# Patient Record
Sex: MALE | Race: WHITE
[De-identification: names, ages, dates, MRNs, and addresses within clinical notes are randomized per-mention and may not be internally consistent; named-entity substitution may affect disease eponyms.]

---

## 2017-05-19 ENCOUNTER — HOSPITAL ENCOUNTER (OUTPATIENT)
Dept: HOSPITAL 47 - LABWHC1 | Age: 62
Discharge: HOME | End: 2017-05-19
Attending: PODIATRIST
Payer: COMMERCIAL

## 2017-05-19 DIAGNOSIS — I87.2: Primary | ICD-10-CM

## 2017-05-19 LAB
ALP SERPL-CCNC: 74 U/L (ref 38–126)
ALT SERPL-CCNC: 31 U/L (ref 21–72)
ANION GAP SERPL CALC-SCNC: 8 MMOL/L
AST SERPL-CCNC: 22 U/L (ref 17–59)
BASOPHILS # BLD AUTO: 0.1 K/UL (ref 0–0.2)
BASOPHILS NFR BLD AUTO: 1 %
BUN SERPL-SCNC: 21 MG/DL (ref 9–20)
CALCIUM SPEC-MCNC: 8.7 MG/DL (ref 8.4–10.2)
CH: 27.6
CHCM: 31.7
CHLORIDE SERPL-SCNC: 107 MMOL/L (ref 98–107)
CO2 SERPL-SCNC: 26 MMOL/L (ref 22–30)
EOSINOPHIL # BLD AUTO: 0.2 K/UL (ref 0–0.7)
EOSINOPHIL NFR BLD AUTO: 3 %
ERYTHROCYTE [DISTWIDTH] IN BLOOD BY AUTOMATED COUNT: 4.83 M/UL (ref 4.3–5.9)
ERYTHROCYTE [DISTWIDTH] IN BLOOD: 14.4 % (ref 11.5–15.5)
GLUCOSE SERPL-MCNC: 140 MG/DL (ref 74–99)
HCT VFR BLD AUTO: 42.3 % (ref 39–53)
HDW: 3
HGB BLD-MCNC: 13.7 GM/DL (ref 13–17.5)
LUC NFR BLD AUTO: 2 %
LYMPHOCYTES # SPEC AUTO: 1.3 K/UL (ref 1–4.8)
LYMPHOCYTES NFR SPEC AUTO: 16 %
MCH RBC QN AUTO: 28.4 PG (ref 25–35)
MCHC RBC AUTO-ENTMCNC: 32.4 G/DL (ref 31–37)
MCV RBC AUTO: 87.6 FL (ref 80–100)
MONOCYTES # BLD AUTO: 0.4 K/UL (ref 0–1)
MONOCYTES NFR BLD AUTO: 5 %
NEUTROPHILS # BLD AUTO: 6.2 K/UL (ref 1.3–7.7)
NEUTROPHILS NFR BLD AUTO: 74 %
NON-AFRICAN AMERICAN GFR(MDRD): >60
POTASSIUM SERPL-SCNC: 4.2 MMOL/L (ref 3.5–5.1)
PREALB SERPL-MCNC: 13 MG/DL (ref 18–36)
PROT SERPL-MCNC: 6.9 G/DL (ref 6.3–8.2)
SODIUM SERPL-SCNC: 141 MMOL/L (ref 137–145)
WBC # BLD AUTO: 0.15 10*3/UL
WBC # BLD AUTO: 8.4 K/UL (ref 3.8–10.6)
WBC (PEROX): 8.27

## 2017-05-19 PROCEDURE — 84134 ASSAY OF PREALBUMIN: CPT

## 2017-05-19 PROCEDURE — 80053 COMPREHEN METABOLIC PANEL: CPT

## 2017-05-19 PROCEDURE — 85025 COMPLETE CBC W/AUTO DIFF WBC: CPT

## 2017-05-19 PROCEDURE — 36415 COLL VENOUS BLD VENIPUNCTURE: CPT

## 2017-05-31 ENCOUNTER — HOSPITAL ENCOUNTER (OUTPATIENT)
Dept: HOSPITAL 47 - RADUSWWP | Age: 62
Discharge: HOME | End: 2017-05-31
Attending: PODIATRIST
Payer: COMMERCIAL

## 2017-05-31 DIAGNOSIS — R60.9: ICD-10-CM

## 2017-05-31 DIAGNOSIS — M79.604: Primary | ICD-10-CM

## 2017-05-31 DIAGNOSIS — M79.605: ICD-10-CM

## 2017-05-31 PROCEDURE — 93922 UPR/L XTREMITY ART 2 LEVELS: CPT

## 2017-05-31 NOTE — US
LOWER EXTREMITY VENOUS INSUFFICIENCY

 

Wound care patient.  Wound left leg x 2 months.  Hx of blood clots in right leg and PE's. On blood th
inners.  Bilateral hip replacement.  No pain. 

 

Comparison: Prior lower extremity venous ultrasound March 25, 2016

 

SIDE PERFORMED:  Bilateral  

 

1)  Color flow is present and patency is documented in the following vessels.  No DVT or SVT is noted
.       

?   EIV           

?   Common Femoral Vein    

?   Deep Femoral Vein

?   Femoral Vein

?   Popliteal Vein

?   Greater Saph Vein  

 

 

2)  There is venous reflux noted at the following venous levels:  

 

Right CFV, Deep Femoral Vein, Femoral Vein and Popliteal Vein.  

 

Left CFV, Femoral Vein and Popliteal Vein.    

 

 

 

 

 

IMPRESSION: No ultrasound evidence for acute DVT in either lower extremity on today's study. Right gr
eater than left lower extremity venous reflux is seen as detailed above.

## 2017-06-07 NOTE — P.ARTDOP
Arterial Doppler





LOWER EXTREMITY ARTERIAL DOPPLER:





DATE OF SERVICE: 05/31/2017





Reason for study: Left leg ulcer.





Doppler waveforms: Multiphasic bilaterally throughout.





Pulse volume recording: Normal configuration.





Pressure gradients: None.





Ankle-brachial indices: Greater than 1 bilaterally.





Toe pressures: 125 on the right, 140 on the left





Impression: Normal study.

## 2018-05-21 ENCOUNTER — HOSPITAL ENCOUNTER (OUTPATIENT)
Dept: HOSPITAL 47 - RADUSWWP | Age: 63
Discharge: HOME | End: 2018-05-21
Attending: PODIATRIST
Payer: COMMERCIAL

## 2018-05-21 DIAGNOSIS — I87.2: Primary | ICD-10-CM

## 2018-05-21 PROCEDURE — 93923 UPR/LXTR ART STDY 3+ LVLS: CPT

## 2018-05-21 NOTE — US
EXAMINATION TYPE: US venous doppler duplex LE LT

 

DATE OF EXAM: 5/21/2018 9:46 AM

 

COMPARISON: 5/31/2017

 

CLINICAL HISTORY: 63-year-old male M79.605 PAIN IN LT LEG.

 

LOWER EXTREMITY VENOUS INSUFFICIENCY

 

SIDE PERFORMED:  left  

 

FINDINGS:

 

1)  Color flow is present and patency is documented in the following vessels.  No DVT or SVT is noted
.       

   EIV           

   Common Femoral Vein    

 

   Femoral Vein

   Popliteal Vein

   

 

   Greater Saph Vein  

   Upper Small Saph Vein  

 

2)  There is venous reflux noted at the following venous levels: 

Mid and lower femoral vein.

Lower popliteal vein.

 

 

Sonographer notes: Very limited study, unable to see upper femoral vein and DFV at CFV bifurcation du
e to patient body habitus. 

 

There is some subcutaneous soft tissue swelling with edema channels in the upper calf.

 

 

 

IMPRESSION: 

1. Limited exam. Due to patient body habitus, unable to visualize the upper femoral vein or the deep 
femoral vein. These vessels are not assessed.

2. No evidence for DVT within the remainder of the left lower extremity imaged from the groin to the 
calf.

3. Venous reflux seen in the mid and lower femoral vein as well as the lower popliteal vein.

## 2018-05-23 NOTE — P.ARTDOP
Arterial Doppler





LOWER EXTREMITY ARTERIAL DOPPLER:





DATE OF SERVICE: 05/21/2018





Reason for study: Left leg ulcer.





Doppler waveforms: Multiphasic bilaterally throughout.





Pulse volume recording: [].





Pressure gradients: None.





Ankle-brachial indices: Greater than 1 bilaterally.





Toe pressures: 131 on the right, 131 on the left





Impression: Normal study.

## 2018-06-14 ENCOUNTER — HOSPITAL ENCOUNTER (OUTPATIENT)
Dept: HOSPITAL 47 - RADECHMAIN | Age: 63
Discharge: HOME | End: 2018-06-14
Payer: COMMERCIAL

## 2018-06-14 DIAGNOSIS — I10: ICD-10-CM

## 2018-06-14 DIAGNOSIS — E66.01: ICD-10-CM

## 2018-06-14 DIAGNOSIS — I34.0: Primary | ICD-10-CM

## 2018-06-14 PROCEDURE — 93306 TTE W/DOPPLER COMPLETE: CPT

## 2018-06-14 NOTE — ECHOF
Referral Reason:R06.02 Shortness of Breath, I10 Hypertension



MEASUREMENTS

--------

HEIGHT: 175.3 cm

WEIGHT: 222.3 kg

BP: 

RVIDd:   3.7 cm     (< 3.3)

IVSd:   1.6 cm     (0.6 - 1.1)

LVIDd:   5.3 cm     (3.9 - 5.3)

LVPWd:   1.5 cm     (0.6 - 1.1)

IVSs:   1.8 cm

LVIDs:   4.1 cm

LVPWs:   1.7 cm

LA Diam:   4.1 cm     (2.7 - 3.8)

Ao Diam:   4.3 cm     (2.0 - 3.7)

AV Cusp:   1.8 cm     (1.5 - 2.6)

LA Diam:   3.3 cm     (2.7 - 3.8)

EPSS:   1.0 cm

MV E Jose Angel:   0.52 m/s

MV DecT:   218 ms

MV A Jose Angel:   0.55 m/s

MV E/A Ratio:   0.94 

MV EF SLOPE:   72.14 mm/s     (70 - 150)

MV EXCURSION:   1.59 cm     (> 18.000)







FINDINGS

--------

Sinus rhythm.

Morbid Obesity

The left ventricular size is normal.   There is mild concentric left ventricular hypertrophy.   Overa
ll left ventricular systolic function is low-normal with, an EF between 50 - 55 %.

The right ventricle is mild to moderately enlarged.

The left atrial size is normal.

The right atrial size is normal.

1.5mg of Definity was utilized for enhancement of images

The aortic valve is trileaflet, and appears structurally normal. No aortic stenosis or regurgitation.


Mild mitral regurgitation is present.

No regurgitation noted   Right ventricular systolic pressure is normal at < 35 mmHg.   There is no ev
idence of pulmonary hypertension.

The pulmonic valve was not well visualized.

There is no pericardial effusion.



CONCLUSIONS

--------

1. Morbid Obesity

2. The left ventricular size is normal.

3. There is mild concentric left ventricular hypertrophy.

4. Overall left ventricular systolic function is low-normal with, an EF between 50 - 55 %.

5. The right ventricle is mild to moderately enlarged.

6. The left atrial size is normal.

7. The right atrial size is normal.

8. 1.5mg of Definity was utilized for enhancement of images

9. The aortic valve is trileaflet, and appears structurally normal. No aortic stenosis or regurgitati
on.

10. Mild mitral regurgitation is present.

11. No regurgitation noted

12. Right ventricular systolic pressure is normal at < 35 mmHg.

13. There is no evidence of pulmonary hypertension.

14. The pulmonic valve was not well visualized.

15. There is no pericardial effusion.





SONOGRAPHER: Zelda Lou RDCS

## 2018-09-13 ENCOUNTER — HOSPITAL ENCOUNTER (OUTPATIENT)
Dept: HOSPITAL 47 - BARWHC3 | Age: 63
Discharge: HOME | End: 2018-09-13
Attending: SURGERY
Payer: MEDICARE

## 2018-09-13 VITALS
DIASTOLIC BLOOD PRESSURE: 81 MMHG | SYSTOLIC BLOOD PRESSURE: 175 MMHG | TEMPERATURE: 98.5 F | HEART RATE: 84 BPM | RESPIRATION RATE: 16 BRPM

## 2018-09-13 VITALS — BODY MASS INDEX: 74.2 KG/M2

## 2018-09-13 DIAGNOSIS — Z79.01: ICD-10-CM

## 2018-09-13 DIAGNOSIS — I82.409: ICD-10-CM

## 2018-09-13 DIAGNOSIS — E66.01: Primary | ICD-10-CM

## 2018-09-13 PROCEDURE — 99211 OFF/OP EST MAY X REQ PHY/QHP: CPT

## 2018-09-13 NOTE — P.HPBAR
Bariatric H&P





- History & Physicial


H&P Date: 09/13/18


History & Physicial: 


Visit/CC: bariatric consult





Patient initial contact: 





Initial weight: 228.066 kg


Initial weight in pounds: 502.80


Height: 5 ft 9 in


Initial BMI: 74.2





Last weight: 





Current weight: 228.066 kg


Current weight in pounds: 502.80


Current BMI: 74.2





Ideal body weight (based on NIH guidelines): 72.575 kg





Excess body weight loss: 0.0%








The patient is a 63 year-old M who presents for Bariatric Assessment.  Patient 

presents today as a new patient bariatric evaluation.  Patient is several with 

his weight for many years.  Currently has a BMI of 74 with a weight of 502.  

Patient suffers from hypertension, diabetes, arthritis, sleep apnea.  The 

patient has a history of previous DVT with PE.  Currently on anticoagulation.  

Denies dysphagia.  Was initially mainly interested in sleeve gastrectomy.  

Previous surgeries including umbilical hernia and nephrectomy.














Past Medical History


Past Medical History: Asthma, Diabetes Mellitus, Deep Vein Thrombosis (DVT), 

Hypertension, Pneumonia, Pulmonary Embolus (PE), Rheumatoid Arthritis (RA), 

Sleep Apnea/CPAP/BIPAP, Vascular Disorder


Additional Past Medical History / Comment(s): current leg wound lt leg, seizure 

as infant, uses cane, scoliosis, March 2015-dvt rt leg and PE ronny lungs, Type 2 

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 

dependent upon cane for amubulation,


History of Any Multi-Drug Resistant Organisms: None Reported


Past Surgical History: Adenoidectomy, Appendectomy, Hernia Repair, Joint 

Replacement, Tonsillectomy


Additional Past Surgical History / Comment(s): Ronny Hip Replacement, Nephrectomy 

left d/t mass(pt stated found to be benign),


Past Anesthesia/Blood Transfusion Reactions: No Reported Reaction


Past Psychological History: Anxiety, Depression


Smoking Status: Never smoker


Past Alcohol Use History: Occasional


Additional Past Alcohol Use History / Comment(s): .


Past Drug Use History: None Reported





- Past Family History


  ** Son(s)


Additional Family Medical History / Comment(s): He has one son with morbid 

obesity.





  ** Mother


Family Medical History: Cancer


Additional Family Medical History / Comment(s): breast cancer





  ** Father


Family Medical History: Cancer, COPD, Coronary Artery Disease (CAD), 

Hypertension, Myocardial Infarction (MI)


Additional Family Medical History / Comment(s): emphysema





Surgical - Exam


 Vital Signs











Temp Pulse Resp BP


 


 98.5 F   84   16   175/81 


 


 09/13/18 14:31  09/13/18 14:31  09/13/18 14:31  09/13/18 14:31

















Physical exam:


General: Well-developed, well-nourished


HEENT: Normocephalic, sclerae nonicteric


Abdomen: Nontender, nondistended, large pannus, previous scars


Extremities: Lower cavity edema with evidence of venous stasis.


Neuro: Alert and oriented





Bariatric Assessment & Plan


(1) Morbid obesity with BMI of 60.0-69.9, adult


Narrative/Plan: 


Options discussed in detail with the patient.  After discussing the risk 

benefit profile of both gastric bypass and sleeve gastrectomy and the 

anticipated weight loss the patient is open to discussing and looking into 

gastric bypass further.  Patient will go to a bariatric seminar this coming 

week put on by Dr. Borges.  Following that the patient will notify us of his 

decision regarding his preferred surgical procedure.  Associated risks with 

both procedure were reviewed in detail.  Will require preoperative cardiac and 

medical clearance.  Patient will be maintained on postoperative anticoagulation.


Status: Acute   





Bariatric Checklist


Checklist: 


Plan: 





Checklist: 





EGD: 


1. Hiatal hernia: 


2. H. Pylori: 





HgbA1c: 





Vitamin D: 





Smoking: Never smoker





Primary care physician referral: sonia





Psychiatry clearance: 





Cardiology clearance: 





Sleep study: 





Diet journal: 





VTE risk score: 





VTE risk level: 





Rehab needs at discharge:

## 2018-10-11 ENCOUNTER — HOSPITAL ENCOUNTER (OUTPATIENT)
Dept: HOSPITAL 47 - BARWHC3 | Age: 63
Discharge: HOME | End: 2018-10-11
Payer: MEDICARE

## 2018-10-11 VITALS — DIASTOLIC BLOOD PRESSURE: 75 MMHG | HEART RATE: 76 BPM | SYSTOLIC BLOOD PRESSURE: 164 MMHG | TEMPERATURE: 98 F

## 2018-10-11 VITALS — BODY MASS INDEX: 73.3 KG/M2

## 2018-10-11 DIAGNOSIS — E55.9: ICD-10-CM

## 2018-10-11 DIAGNOSIS — F32.9: ICD-10-CM

## 2018-10-11 DIAGNOSIS — E88.81: ICD-10-CM

## 2018-10-11 DIAGNOSIS — E11.622: Primary | ICD-10-CM

## 2018-10-11 DIAGNOSIS — I11.9: ICD-10-CM

## 2018-10-11 DIAGNOSIS — Z98.890: ICD-10-CM

## 2018-10-11 DIAGNOSIS — L98.499: ICD-10-CM

## 2018-10-11 DIAGNOSIS — E66.01: ICD-10-CM

## 2018-10-11 DIAGNOSIS — Z90.89: ICD-10-CM

## 2018-10-11 DIAGNOSIS — G47.30: ICD-10-CM

## 2018-10-11 DIAGNOSIS — E44.0: ICD-10-CM

## 2018-10-11 DIAGNOSIS — F41.9: ICD-10-CM

## 2018-10-11 LAB
ALBUMIN SERPL-MCNC: 3.5 G/DL (ref 3.5–5)
ALP SERPL-CCNC: 75 U/L (ref 38–126)
ALT SERPL-CCNC: 38 U/L (ref 21–72)
ANION GAP SERPL CALC-SCNC: 6 MMOL/L
AST SERPL-CCNC: 19 U/L (ref 17–59)
BUN SERPL-SCNC: 21 MG/DL (ref 9–20)
CALCIUM SPEC-MCNC: 9.1 MG/DL (ref 8.4–10.2)
CHLORIDE SERPL-SCNC: 99 MMOL/L (ref 98–107)
CHOLEST SERPL-MCNC: 139 MG/DL (ref ?–200)
CO2 SERPL-SCNC: 34 MMOL/L (ref 22–30)
ERYTHROCYTE [DISTWIDTH] IN BLOOD BY AUTOMATED COUNT: 5.54 M/UL (ref 4.3–5.9)
ERYTHROCYTE [DISTWIDTH] IN BLOOD: 14.2 % (ref 11.5–15.5)
GLUCOSE SERPL-MCNC: 110 MG/DL (ref 74–99)
HBA1C MFR BLD: 8.1 % (ref 4–6)
HCT VFR BLD AUTO: 47.2 % (ref 39–53)
HDLC SERPL-MCNC: 48 MG/DL (ref 40–60)
HGB BLD-MCNC: 14.9 GM/DL (ref 13–17.5)
LDLC SERPL CALC-MCNC: 74 MG/DL (ref 0–99)
MCH RBC QN AUTO: 26.9 PG (ref 25–35)
MCHC RBC AUTO-ENTMCNC: 31.6 G/DL (ref 31–37)
MCV RBC AUTO: 85.2 FL (ref 80–100)
PLATELET # BLD AUTO: 208 K/UL (ref 150–450)
POTASSIUM SERPL-SCNC: 4.3 MMOL/L (ref 3.5–5.1)
PROT SERPL-MCNC: 7.3 G/DL (ref 6.3–8.2)
SODIUM SERPL-SCNC: 139 MMOL/L (ref 137–145)
TRIGL SERPL-MCNC: 84 MG/DL (ref ?–150)
VIT B12 SERPL-MCNC: 528 PG/ML (ref 200–944)
WBC # BLD AUTO: 15.5 K/UL (ref 3.8–10.6)

## 2018-10-11 PROCEDURE — 93005 ELECTROCARDIOGRAM TRACING: CPT

## 2018-10-11 PROCEDURE — 82728 ASSAY OF FERRITIN: CPT

## 2018-10-11 PROCEDURE — 80061 LIPID PANEL: CPT

## 2018-10-11 PROCEDURE — 99211 OFF/OP EST MAY X REQ PHY/QHP: CPT

## 2018-10-11 PROCEDURE — 84425 ASSAY OF VITAMIN B-1: CPT

## 2018-10-11 PROCEDURE — 80053 COMPREHEN METABOLIC PANEL: CPT

## 2018-10-11 PROCEDURE — 82306 VITAMIN D 25 HYDROXY: CPT

## 2018-10-11 PROCEDURE — 83550 IRON BINDING TEST: CPT

## 2018-10-11 PROCEDURE — 83540 ASSAY OF IRON: CPT

## 2018-10-11 PROCEDURE — 82746 ASSAY OF FOLIC ACID SERUM: CPT

## 2018-10-11 PROCEDURE — 84443 ASSAY THYROID STIM HORMONE: CPT

## 2018-10-11 PROCEDURE — 83036 HEMOGLOBIN GLYCOSYLATED A1C: CPT

## 2018-10-11 PROCEDURE — 85027 COMPLETE CBC AUTOMATED: CPT

## 2018-10-11 PROCEDURE — 82607 VITAMIN B-12: CPT

## 2018-10-11 PROCEDURE — 36415 COLL VENOUS BLD VENIPUNCTURE: CPT

## 2018-10-11 NOTE — P.HPBAR
Bariatric H&P





- History & Physicial


H&P Date: 10/11/18


History & Physicial: 


Visit/CC: RnY consult





Patient initial contact: 





Initial weight: 228.066 kg


Initial weight in pounds: 502.80


Height: 5 ft 9 in


Initial BMI: 74.2





Last weight: 





Current weight: 225.163 kg


Current weight in pounds: 496.40


Current BMI: 73.3





Ideal body weight (based on NIH guidelines): 72.575 kg





Excess body weight loss: 1.8%








The patient is a 63 year-old M who presents for Bariatric Assessment.








HPI: The patient presents for bariatric evaluation following recent diagnosis 

of diabetes. His highest weight is 503 pounds. He is a patient at the wound 

care center for chronic wound ulcers.





ABDOMEN: Protuberant





PLAN:


1. Recommend diabetic education


2. Bariatric labs


3. Will need multiple clearances given high risk


4. Evaluation of sleeve gastrectomy and bypass reviewed.





Past Medical History


Past Medical History: Asthma, Diabetes Mellitus, Deep Vein Thrombosis (DVT), 

Hypertension, Pneumonia, Pulmonary Embolus (PE), Rheumatoid Arthritis (RA), 

Sleep Apnea/CPAP/BIPAP, Vascular Disorder


Additional Past Medical History / Comment(s): current leg wound lt leg, seizure 

as infant, uses cane, scoliosis, March 2015-dvt rt leg and PE ronny lungs, Type 2 

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 

dependent upon cane for amubulation,


History of Any Multi-Drug Resistant Organisms: None Reported


Past Surgical History: Adenoidectomy, Appendectomy, Hernia Repair, Joint 

Replacement, Tonsillectomy


Additional Past Surgical History / Comment(s): Ronny Hip Replacement, Nephrectomy 

left d/t mass(pt stated found to be benign),


Past Anesthesia/Blood Transfusion Reactions: No Reported Reaction


Past Psychological History: Anxiety, Depression


Smoking Status: Never smoker


Past Alcohol Use History: Occasional


Additional Past Alcohol Use History / Comment(s): .


Past Drug Use History: None Reported





- Past Family History


  ** Son(s)


Additional Family Medical History / Comment(s): He has one son with morbid 

obesity.





  ** Mother


Family Medical History: Cancer


Additional Family Medical History / Comment(s): breast cancer





  ** Father


Family Medical History: Cancer, COPD, Coronary Artery Disease (CAD), 

Hypertension, Myocardial Infarction (MI)


Additional Family Medical History / Comment(s): emphysema





Surgical - Exam


 Vital Signs











Temp Pulse BP


 


 98 F   76   164/75 


 


 10/11/18 12:11  10/11/18 12:11  10/11/18 12:11














Results





- Labs





 10/11/18 13:40





 10/11/18 13:40


 Abnormal Lab Results - Last 24 Hours (Table)











  10/11/18 10/11/18 10/11/18 Range/Units





  13:40 13:40 13:40 


 


WBC  15.5 H    (3.8-10.6)  k/uL


 


Carbon Dioxide   34 H   (22-30)  mmol/L


 


BUN   21 H   (9-20)  mg/dL


 


Glucose   110 H   (74-99)  mg/dL


 


Hemoglobin A1c     (4.0-6.0)  %


 


Iron    42 L  ()  ug/dL


 


Iron Saturation    11.60 L  (15.00-50.00)   














  10/11/18 Range/Units





  13:40 


 


WBC   (3.8-10.6)  k/uL


 


Carbon Dioxide   (22-30)  mmol/L


 


BUN   (9-20)  mg/dL


 


Glucose   (74-99)  mg/dL


 


Hemoglobin A1c  8.1 H  (4.0-6.0)  %


 


Iron   ()  ug/dL


 


Iron Saturation   (15.00-50.00)   








 Diabetes panel











  10/11/18 10/11/18 Range/Units





  13:40 13:40 


 


Sodium  139   (137-145)  mmol/L


 


Potassium  4.3   (3.5-5.1)  mmol/L


 


Chloride  99   ()  mmol/L


 


Carbon Dioxide  34 H   (22-30)  mmol/L


 


BUN  21 H   (9-20)  mg/dL


 


Creatinine  0.77   (0.66-1.25)  mg/dL


 


Glucose  110 H   (74-99)  mg/dL


 


Hemoglobin A1c   8.1 H  (4.0-6.0)  %


 


Calcium  9.1   (8.4-10.2)  mg/dL


 


AST  19   (17-59)  U/L


 


ALT  38   (21-72)  U/L


 


Alkaline Phosphatase  75   ()  U/L


 


Total Protein  7.3   (6.3-8.2)  g/dL


 


Albumin  3.5   (3.5-5.0)  g/dL


 


Triglycerides  84   (<150)  mg/dL


 


HDL Cholesterol  48   (40-60)  mg/dL








 Thyroid panel











  10/11/18 Range/Units





  13:40 


 


TSH  1.230  (0.465-4.680)  mIU/L








 Calcium panel











  10/11/18 Range/Units





  13:40 


 


Calcium  9.1  (8.4-10.2)  mg/dL


 


Albumin  3.5  (3.5-5.0)  g/dL








 Pituitary panel











  10/11/18 Range/Units





  13:40 


 


Sodium  139  (137-145)  mmol/L


 


Potassium  4.3  (3.5-5.1)  mmol/L


 


Chloride  99  ()  mmol/L


 


Carbon Dioxide  34 H  (22-30)  mmol/L


 


BUN  21 H  (9-20)  mg/dL


 


Creatinine  0.77  (0.66-1.25)  mg/dL


 


Glucose  110 H  (74-99)  mg/dL


 


Calcium  9.1  (8.4-10.2)  mg/dL


 


TSH  1.230  (0.465-4.680)  mIU/L








 Adrenal panel











  10/11/18 Range/Units





  13:40 


 


Sodium  139  (137-145)  mmol/L


 


Potassium  4.3  (3.5-5.1)  mmol/L


 


Chloride  99  ()  mmol/L


 


Carbon Dioxide  34 H  (22-30)  mmol/L


 


BUN  21 H  (9-20)  mg/dL


 


Creatinine  0.77  (0.66-1.25)  mg/dL


 


Glucose  110 H  (74-99)  mg/dL


 


Calcium  9.1  (8.4-10.2)  mg/dL


 


Total Bilirubin  0.7  (0.2-1.3)  mg/dL


 


AST  19  (17-59)  U/L


 


ALT  38  (21-72)  U/L


 


Alkaline Phosphatase  75  ()  U/L


 


Total Protein  7.3  (6.3-8.2)  g/dL


 


Albumin  3.5  (3.5-5.0)  g/dL














Bariatric Checklist


Checklist: 


Plan: 





Checklist: 





EGD: 


1. Hiatal hernia: 


2. H. Pylori: 





HgbA1c: 





Vitamin D: 





Smoking: Never smoker





Primary care physician referral: sonia





Psychiatry clearance: 





Cardiology clearance: 





Sleep study: 





Diet journal: 





VTE risk score: 





VTE risk level: 





Rehab needs at discharge:

## 2018-11-04 NOTE — P.GSHP
History of Present Illness


H&P Date: 11/05/18














CHIEF COMPLAINT: GERD





HISTORY OF PRESENT ILLNESS: The patient is a 63-year-old male who


presents reports gastroesophageal reflux disease.  Upper endoscopy was offered 

for further evaluation and management.





PAST MEDICAL HISTORY: 


Please see list.





PAST SURGICAL HISTORY: 


Please see list.





MEDICATIONS: 


Please see list.





ALLERGIES:  Please see list. 





SOCIAL HISTORY: No illicit drug use





FAMILY HISTORY: No reports of Crohn disease or ulcerative colitis. 





REVIEW OF ORGAN SYSTEMS: 


CONSTITUTIONAL: No reports of fevers or chills. 


GI:  Denies any blood in stools or constipation. 





PHYSICAL EXAM: 


VITAL SIGNS:  Stable


GENERAL: Well-developed and pleasant in no acute distress. 


HEENT: No scleral icterus. Extraocular movements grossly


intact. Moist buccal mucosa. 


NECK: Supple without lymphadenopathy. 


CHEST: Unlabored respirations. Equal bilateral excursions. 


CARDIOVASCULAR: Regular rate and rhythm. Distal 2+ pulses. 


ABDOMEN: Soft, nondistended.  


MUSCULOSKELETAL: No clubbing, cyanosis, or edema. 





ASSESSMENT: 


1.  Gastroesophageal reflux disease





PLAN: 


1. Recommend proceeding with an upper endoscopy





Past Medical History


Past Medical History: Asthma, Diabetes Mellitus, Deep Vein Thrombosis (DVT), 

Hypertension, Pneumonia, Pulmonary Embolus (PE), Rheumatoid Arthritis (RA), 

Sleep Apnea/CPAP/BIPAP, Vascular Disorder


Additional Past Medical History / Comment(s): , seizure as infant, uses cane, 

scoliosis, March 2015-dvt rt leg and PE ronny lungs, Type 2 DM, Nerve damage to 

right quadricep following right hip replacement left him dependent upon cane 

for amubulation,


History of Any Multi-Drug Resistant Organisms: None Reported


Past Surgical History: Adenoidectomy, Appendectomy, Hernia Repair, Joint 

Replacement, Tonsillectomy


Additional Past Surgical History / Comment(s): Ronny Hip Replacement, Nephrectomy 

left d/t mass(pt stated found to be benign),.  COLONOSCOPY,


Past Anesthesia/Blood Transfusion Reactions: No Reported Reaction


Smoking Status: Never smoker





- Past Family History


  ** Son(s)


Additional Family Medical History / Comment(s): He has one son with morbid 

obesity.





  ** Mother


Family Medical History: Cancer


Additional Family Medical History / Comment(s): breast cancer





  ** Father


Family Medical History: Cancer, COPD, Coronary Artery Disease (CAD), 

Hypertension, Myocardial Infarction (MI)


Additional Family Medical History / Comment(s): emphysema





Medications and Allergies


 Home Medications











 Medication  Instructions  Recorded  Confirmed  Type


 


Carisoprodol [Soma] 350 mg PO TID PRN 05/01/14 10/31/18 History


 


traMADol HCL [Ultram] 50 mg PO Q8HR PRN 11/15/17 10/31/18 History


 


Albuterol Nebulized [Ventolin 2.5 mg INHALATION TID PRN 05/08/18 10/31/18 

History





Nebulized]    


 


Metoprolol Tartrate 25 mg PO BID 05/08/18 10/31/18 History


 


Rivaroxaban [Xarelto] 10 mg PO DAILY 05/08/18 10/31/18 History


 


Lisinopril [Zestril] 5 mg PO DAILY 08/23/18 10/31/18 History


 


Vit D3/Folic Acid/B2/B6/B12 1.25 tab PO Q30D 08/23/18 10/31/18 History





[Folgard Tablet]    


 


metFORMIN HCL [Glucophage] 500 mg PO BID 10/11/18 10/31/18 History











 Allergies











Allergy/AdvReac Type Severity Reaction Status Date / Time


 


Sulfa (Sulfonamide Allergy  throat Verified 10/31/18 15:14





Antibiotics)   swelling

## 2018-11-05 ENCOUNTER — HOSPITAL ENCOUNTER (OUTPATIENT)
Dept: HOSPITAL 47 - ORWHC2ENDO | Age: 63
Discharge: HOME | End: 2018-11-05
Payer: MEDICARE

## 2018-11-05 VITALS — DIASTOLIC BLOOD PRESSURE: 75 MMHG | SYSTOLIC BLOOD PRESSURE: 114 MMHG

## 2018-11-05 VITALS — RESPIRATION RATE: 18 BRPM

## 2018-11-05 VITALS — HEART RATE: 71 BPM

## 2018-11-05 VITALS — BODY MASS INDEX: 73.2 KG/M2

## 2018-11-05 VITALS — TEMPERATURE: 99 F

## 2018-11-05 DIAGNOSIS — I10: ICD-10-CM

## 2018-11-05 DIAGNOSIS — K29.50: Primary | ICD-10-CM

## 2018-11-05 DIAGNOSIS — M41.9: ICD-10-CM

## 2018-11-05 DIAGNOSIS — E66.01: ICD-10-CM

## 2018-11-05 DIAGNOSIS — Z86.711: ICD-10-CM

## 2018-11-05 DIAGNOSIS — J45.909: ICD-10-CM

## 2018-11-05 DIAGNOSIS — Z86.718: ICD-10-CM

## 2018-11-05 DIAGNOSIS — Z79.84: ICD-10-CM

## 2018-11-05 DIAGNOSIS — Z79.899: ICD-10-CM

## 2018-11-05 DIAGNOSIS — Z79.01: ICD-10-CM

## 2018-11-05 DIAGNOSIS — E11.9: ICD-10-CM

## 2018-11-05 DIAGNOSIS — G47.33: ICD-10-CM

## 2018-11-05 DIAGNOSIS — Z88.2: ICD-10-CM

## 2018-11-05 DIAGNOSIS — Z99.89: ICD-10-CM

## 2018-11-05 LAB — GLUCOSE BLD-MCNC: 141 MG/DL (ref 75–99)

## 2018-11-05 PROCEDURE — 88305 TISSUE EXAM BY PATHOLOGIST: CPT

## 2018-11-05 PROCEDURE — 43239 EGD BIOPSY SINGLE/MULTIPLE: CPT

## 2018-11-05 NOTE — P.PCN
Date of Procedure: 11/05/18


Description of Procedure: 











PREOPERATIVE DIAGNOSIS:


Gastroesophageal reflux disease.


Morbid obesity.





POSTOPERATIVE DIAGNOSIS:


Morbid obesity.


Gastroesophageal reflux disease.





OPERATION:


Esophagogastroduodenoscopy with biopsies along antrum.





SURGEON: Naz Lao MD





ANESTHESIA: MAC.





INDICATIONS:


The patient is a 63-year-old male who presents with a history of reflux 

disease. Benefits and risks of the procedure were described. Informed consent 

was obtained.





DESCRIPTION:


The patient was brought into the endoscopy suite and laid in the left lateral 

decubitus position. An Olympus gastroscope was passed along the posterior 

oropharynx down to the distal esophagus where the squamocolumnar junction was 

encountered at 42 cm from the incisors. The stomach was entered and no bile 

reflux was found.  Additional findings are listed below. Biopsies with cold 

forceps were obtained of the antrum. The first through third portion of the 

duodenum was examined and unremarkable. Retroflexion of the scope confirmed  

Hill grade 2 lower esophageal valve. The squamocolumnar junction demonstrated 

no LA grade A erosive esophagitis. The stomach was desufflated. The patient 

tolerated the procedure well.





FINDINGS:


Squamocolumnar junction 40 cm from the incisors.


Diaphragmatic hiatus at 40 cm.


Hill grade 2 lower esophageal valve.


No LA grade A erosive esophagitis.


No active duodenitis.


Mild chronic gastritis





RECOMMENDATIONS:


Upper endoscopy as needed.





Plan - Discharge Summary


New Discharge Prescriptions: 


No Action


   Carisoprodol [Soma] 350 mg PO TID PRN


     PRN Reason: Pain


   traMADol HCL [Ultram] 50 mg PO Q8HR PRN


     PRN Reason: Pain


   Rivaroxaban [Xarelto] 10 mg PO DAILY


   Metoprolol Tartrate 25 mg PO BID


   Albuterol Nebulized [Ventolin Nebulized] 2.5 mg INHALATION TID PRN


     PRN Reason: sob


   Lisinopril [Zestril] 5 mg PO DAILY


   Vit D3/Folic Acid/B2/B6/B12 [Folgard Tablet] 1.25 tab PO Q30D


   metFORMIN HCL [Glucophage] 500 mg PO BID


Discharge Medication List





Carisoprodol [Soma] 350 mg PO TID PRN 05/01/14 [History]


traMADol HCL [Ultram] 50 mg PO Q8HR PRN 11/15/17 [History]


Albuterol Nebulized [Ventolin Nebulized] 2.5 mg INHALATION TID PRN 05/08/18 [

History]


Metoprolol Tartrate 25 mg PO BID 05/08/18 [History]


Rivaroxaban [Xarelto] 10 mg PO DAILY 05/08/18 [History]


Lisinopril [Zestril] 5 mg PO DAILY 08/23/18 [History]


Vit D3/Folic Acid/B2/B6/B12 [Folgard Tablet] 1.25 tab PO Q30D 08/23/18 [History]


metFORMIN HCL [Glucophage] 500 mg PO BID 10/11/18 [History]

## 2018-12-03 ENCOUNTER — HOSPITAL ENCOUNTER (OUTPATIENT)
Dept: HOSPITAL 47 - BARWHC3 | Age: 63
Discharge: HOME | End: 2018-12-03
Payer: MEDICARE

## 2018-12-03 VITALS — BODY MASS INDEX: 73.2 KG/M2

## 2018-12-03 DIAGNOSIS — E66.01: Primary | ICD-10-CM

## 2018-12-03 PROCEDURE — 97804 MEDICAL NUTRITION GROUP: CPT

## 2019-04-05 ENCOUNTER — HOSPITAL ENCOUNTER (OUTPATIENT)
Dept: HOSPITAL 47 - LABWHC1 | Age: 64
End: 2019-04-05
Attending: PODIATRIST
Payer: MEDICARE

## 2019-04-05 DIAGNOSIS — I87.2: Primary | ICD-10-CM

## 2019-04-05 LAB
ALBUMIN SERPL-MCNC: 3.3 G/DL (ref 3.8–4.9)
ALBUMIN/GLOB SERPL: 1.06 G/DL (ref 1.6–3.17)
ALP SERPL-CCNC: 57 U/L (ref 41–126)
ALT SERPL-CCNC: 16 U/L (ref 10–49)
ANION GAP SERPL CALC-SCNC: 7.5 MMOL/L (ref 4–12)
AST SERPL-CCNC: 21 U/L (ref 14–35)
BUN SERPL-SCNC: 15 MG/DL (ref 9–27)
CALCIUM SPEC-MCNC: 8.7 MG/DL (ref 8.7–10.3)
CHLORIDE SERPL-SCNC: 102 MMOL/L (ref 96–109)
CO2 SERPL-SCNC: 30.5 MMOL/L (ref 21.6–31.8)
ERYTHROCYTE [DISTWIDTH] IN BLOOD BY AUTOMATED COUNT: 5.17 M/UL (ref 4.3–5.9)
ERYTHROCYTE [DISTWIDTH] IN BLOOD: 15.2 % (ref 11.5–15.5)
GLOBULIN SER CALC-MCNC: 3.1 G/DL (ref 1.6–3.3)
GLUCOSE SERPL-MCNC: 87 MG/DL (ref 70–110)
HBA1C MFR BLD: 6.2 % (ref 4–6)
HCT VFR BLD AUTO: 43.2 % (ref 39–53)
HGB BLD-MCNC: 13.3 GM/DL (ref 13–17.5)
MCH RBC QN AUTO: 25.7 PG (ref 25–35)
MCHC RBC AUTO-ENTMCNC: 30.7 G/DL (ref 31–37)
MCV RBC AUTO: 83.5 FL (ref 80–100)
PLATELET # BLD AUTO: 193 K/UL (ref 150–450)
POTASSIUM SERPL-SCNC: 4.7 MMOL/L (ref 3.5–5.5)
PROT SERPL-MCNC: 6.4 G/DL (ref 6.2–8.2)
SODIUM SERPL-SCNC: 140 MMOL/L (ref 135–145)
WBC # BLD AUTO: 9.1 K/UL (ref 3.8–10.6)

## 2019-04-05 PROCEDURE — 36415 COLL VENOUS BLD VENIPUNCTURE: CPT

## 2019-04-05 PROCEDURE — 85027 COMPLETE CBC AUTOMATED: CPT

## 2019-04-05 PROCEDURE — 80053 COMPREHEN METABOLIC PANEL: CPT

## 2019-04-05 PROCEDURE — 84134 ASSAY OF PREALBUMIN: CPT

## 2019-04-05 PROCEDURE — 83036 HEMOGLOBIN GLYCOSYLATED A1C: CPT

## 2019-06-05 ENCOUNTER — HOSPITAL ENCOUNTER (OUTPATIENT)
Dept: HOSPITAL 47 - BARWHC3 | Age: 64
End: 2019-06-05
Payer: MEDICARE

## 2019-06-05 VITALS — TEMPERATURE: 98.3 F | DIASTOLIC BLOOD PRESSURE: 66 MMHG | SYSTOLIC BLOOD PRESSURE: 102 MMHG | HEART RATE: 64 BPM

## 2019-06-05 VITALS — BODY MASS INDEX: 71.1 KG/M2

## 2019-06-05 DIAGNOSIS — I11.9: ICD-10-CM

## 2019-06-05 DIAGNOSIS — G47.33: ICD-10-CM

## 2019-06-05 DIAGNOSIS — I87.8: ICD-10-CM

## 2019-06-05 DIAGNOSIS — Z79.899: ICD-10-CM

## 2019-06-05 DIAGNOSIS — M17.0: ICD-10-CM

## 2019-06-05 DIAGNOSIS — M19.90: ICD-10-CM

## 2019-06-05 DIAGNOSIS — I26.99: ICD-10-CM

## 2019-06-05 DIAGNOSIS — F41.8: ICD-10-CM

## 2019-06-05 DIAGNOSIS — I82.409: ICD-10-CM

## 2019-06-05 DIAGNOSIS — E11.9: ICD-10-CM

## 2019-06-05 DIAGNOSIS — Z79.84: ICD-10-CM

## 2019-06-05 DIAGNOSIS — E66.01: Primary | ICD-10-CM

## 2019-06-05 DIAGNOSIS — Z88.2: ICD-10-CM

## 2019-06-05 DIAGNOSIS — J18.9: ICD-10-CM

## 2019-06-05 DIAGNOSIS — Z88.1: ICD-10-CM

## 2019-06-05 DIAGNOSIS — J45.909: ICD-10-CM

## 2019-06-05 DIAGNOSIS — M06.9: ICD-10-CM

## 2019-06-05 PROCEDURE — 99211 OFF/OP EST MAY X REQ PHY/QHP: CPT

## 2019-06-05 NOTE — P.PN
Subjective


Progress Note Date: 06/05/19





He is looking into gastrectomy procedure. He has completed cardiac, pulmonary. 

He has chronic infections and had elevated WBC.  He ambulates with walker, canes

and wheelchair. He has lost 20+ pounds in 8 months. His blood pressure is 

improved. Sleeve procedure consent. All labs and clearances reviewed. Risks 

included.








DATE OF SERVICE: 10/11/2018





REASON FOR CONSULTATION: Initial bariatric evaluation. 





HISTORY OF PRESENT ILLNESS:  Kareem Groves is a 63-year-old male who comes in with 

super morbid obesity.  He patient presents for bariatric evaluation following 

recent diagnosis of diabetes. His highest weight is 503 pounds. He is a patient 

at the wound care center for chronic wound ulcers.  He has limited mobility as 

he uses a scooter to move around.  He has history of severe lymphedema of the 

bilateral lower extremities.





At height of 5 feet 9 inches, his ideal body weight is 168 pounds.  He comes in 

495 pounds.  His highest weight  is 503 pounds. His body mass index highest was 

74.0. Today his BMI is 73.3.  He is 327 pounds overweight.





PAST MEDICAL HISTORY: 


1.  Morbid obesity due to excess calories


2.  Body mass index of 74.0, initial


3.  Osteoarthritis of the knees.


4.  Osteoarthritis of the lower back.


5.  Hypertensive heart disease.


6.  Asthma


7.  Obstructive sleep apnea


8.  Deep venous thrombosis


9.  Pulmonary embolism


10. Rheumatoid arthritis


11. Pneumonia


12. Diabetes type 2


13.  Anxiety disorder


14.  Depressive disorder


15.  Venous stasis disease





PAST SURGICAL HISTORY: 


1.  Appendectomy


2.  Tonsillectomy


3.  Adenoidectomy


4.  Hernia repair


5.  Bilateral hip replacement


6.  Nephrectomy





HOME MEDICATIONS: 





ALLERGIES:





                     Home Medications











 Medication  Instructions  Recorded  Confirmed  Type


 


Carisoprodol [Soma] 350 mg PO TID PRN 05/01/14 10/31/18 History


 


traMADol HCL [Ultram] 50 mg PO Q8HR PRN 11/15/17 10/31/18 History


 


Albuterol Nebulized [Ventolin 2.5 mg INHALATION TID PRN 05/08/18 10/31/18 

History





Nebulized]    


 


Metoprolol Tartrate 25 mg PO BID 05/08/18 10/31/18 History


 


Rivaroxaban [Xarelto] 10 mg PO DAILY 05/08/18 10/31/18 History


 


Lisinopril [Zestril] 5 mg PO DAILY 08/23/18 10/31/18 History


 


Vit D3/Folic Acid/B2/B6/B12 1.25 tab PO Q30D 08/23/18 10/31/18 History





[Folgard Tablet]    


 


metFORMIN HCL [Glucophage] 500 mg PO BID 10/11/18 10/31/18 History











                                    Allergies











Allergy/AdvReac Type Severity Reaction Status Date / Time


 


Sulfa (Sulfonamide Allergy  throat Verified 10/31/18 15:14





Antibiotics)   swelling  

















SOCIAL HISTORY: No active tobacco use.   





FAMILY HISTORY: No family history of ulcerative colitis disease or Crohn's 

disease. Family history of morbid obesity. No lupus in the family.  No reports 

of stomach or esophageal cancer.  





REVIEW OF ORGAN SYSTEMS: 


CONSTITUTIONAL:  At height of 5 feet 9 inches, his ideal body weight is 168 

pounds.  He comes in 495 pounds.  His highest weight  is 503 pounds. His body 

mass index highest was 74.0. Today his BMI is 73.3.  He is 327 pounds 

overweight.


HEENT: Denies any active troubles with vision or hearing.  No troubles with 

swallowing.  


ENDOCRINE: No diabetes. No hypothyroidism. 


CARDIOVASCULAR: No reports of palpitations or heart attacks or chest pain.  Has 

bilateral leg ulcers.


RESPIRATORY: Has daytime somnolence. Has asthma. 


GI: Denies any bright red blood per rectum.  No diarrhea. Has constipation.


MUSCULOSKELETAL: Has lower back pain and joint pain.  Has osteoarthritis of the 

knees.  History of bilateral lower extremity edema.


NEURO: No headaches. No seizure disorders. 


PSYCH: Has depression. No suicidal ideation.   


RHEUMATOLOGIC: No lupus.  No rheumatoid arthritis.  


HEMATOLOGIC: Denies any abnormal bleeding or bruising.  No personal history of 

DVTs.  


SKIN: No rash.  No skin cancer.  History of venous stasis disease.





PHYSICAL EXAM: 


VITAL SIGNS: Height 5 foot 9 inches, weight 495 pounds. BMI 73.3


                                   Vital Signs











Temp  98 F   10/11/18 12:11


 


Pulse  76   10/11/18 12:11


 


Resp      


 


BP  164/75   10/11/18 12:11


 


Pulse Ox      














GENERAL: Well-developed in no acute distress.  


HEENT: No scleral icterus.  Extraocular movements grossly intact.  Hears 

conversational speech.  No nasal drainage.


NECK: Supple without lymphadenopathy. 


CHEST: Nonlabored respirations with equal bilateral excursions. 


CARDIOVASCULAR: Regular rate and regular rhythm. Distal 2+ pulses. 


ABDOMEN: Obese, soft, nontender, nondistended. 


MUSCULOSKELETAL: No clubbing, cyanosis. Gross strength 5/5 distal lower 

extremities.  


NEURO: No focal or lateralizing signs. Cranial nerves 2 through 12 grossly 

within normal limits. 


PSYCH: Appropriate affect. Alert and oriented to person, place and time.


SKIN: Good skin turgor.  Well perfused.





ASSESSMENT: 


1.  Morbid obesity due to excess calories


2.  Body mass index of 74.0, initial


3.  Osteoarthritis of the knees.


4.  Osteoarthritis of the lower back.


5.  Hypertensive heart disease.


6.  Asthma


7.  Obstructive sleep apnea


8.  Deep venous thrombosis


9.  Pulmonary embolism


10. Rheumatoid arthritis


11. Pneumonia


12. Diabetes type 2


13.  Anxiety disorder


14.  Depressive disorder


15.  Venous stasis disease





PLAN: 


1.  Surgical options including a band, gastric bypass, sleeve gastrectomy were 

described in detail.  Alternatives such as gastric balloon including duodenal 

switch were described.


2.  The [place] bariatric surgical collaborative data and outcomes calculator 

were described with surgical options.


3.  Recommend a bariatric metabolic panel to evaluate for micro- including 

macronutrient deficiencies. 


4.  For history of daytime somnolence, recommend evaluation and treatment for 

sleep apnea.


5.  Dietary surveillance and counseling was reviewed, I have asked increased 

protein intake to at least 65 grams daily.  


6.  Will need cardiac risk assessment.


7.  Recommend medical risk assessment.


8.  Psych assessment per insurance guidelines.


9.  Recommend upper endoscopy.


10.  Recommend 12-lead EKG.


11.  Recommend diabetic education


12.  Will need multiple clearances given her high risk


13.  Evaluation of sleeve gastrectomy and bypass reviewed per patient request..








Objective





- Vital Signs


Vital signs: 


                                   Vital Signs











Temp  98.3 F   06/05/19 15:01


 


Pulse  64   06/05/19 15:01


 


Resp      


 


BP  102/66   06/05/19 15:01


 


Pulse Ox      








                                 Intake & Output











 06/04/19 06/05/19 06/05/19





 18:59 06:59 18:59


 


Weight   218.586 kg

## 2019-07-17 ENCOUNTER — HOSPITAL ENCOUNTER (OUTPATIENT)
Dept: HOSPITAL 47 - BARWHC3 | Age: 64
Discharge: HOME | End: 2019-07-17
Payer: MEDICARE

## 2019-07-17 DIAGNOSIS — Z53.9: Primary | ICD-10-CM

## 2019-08-13 ENCOUNTER — HOSPITAL ENCOUNTER (OUTPATIENT)
Dept: HOSPITAL 47 - LABPAT | Age: 64
Discharge: HOME | End: 2019-08-13
Payer: MEDICARE

## 2019-08-13 DIAGNOSIS — Z01.812: Primary | ICD-10-CM

## 2019-08-13 DIAGNOSIS — Z01.818: ICD-10-CM

## 2019-08-13 LAB
ALBUMIN SERPL-MCNC: 3.7 G/DL (ref 3.5–5)
ALP SERPL-CCNC: 69 U/L (ref 38–126)
ALT SERPL-CCNC: 29 U/L (ref 21–72)
ANION GAP SERPL CALC-SCNC: 11 MMOL/L
AST SERPL-CCNC: 21 U/L (ref 17–59)
BASOPHILS # BLD AUTO: 0 K/UL (ref 0–0.2)
BASOPHILS NFR BLD AUTO: 0 %
BUN SERPL-SCNC: 26 MG/DL (ref 9–20)
CALCIUM SPEC-MCNC: 9.6 MG/DL (ref 8.4–10.2)
CHLORIDE SERPL-SCNC: 100 MMOL/L (ref 98–107)
CO2 SERPL-SCNC: 25 MMOL/L (ref 22–30)
EOSINOPHIL # BLD AUTO: 0.1 K/UL (ref 0–0.7)
EOSINOPHIL NFR BLD AUTO: 1 %
ERYTHROCYTE [DISTWIDTH] IN BLOOD BY AUTOMATED COUNT: 5.48 M/UL (ref 4.3–5.9)
ERYTHROCYTE [DISTWIDTH] IN BLOOD: 15.6 % (ref 11.5–15.5)
GLUCOSE SERPL-MCNC: 86 MG/DL (ref 74–99)
HCT VFR BLD AUTO: 46.7 % (ref 39–53)
HGB BLD-MCNC: 14.5 GM/DL (ref 13–17.5)
LYMPHOCYTES # SPEC AUTO: 2.6 K/UL (ref 1–4.8)
LYMPHOCYTES NFR SPEC AUTO: 18 %
MCH RBC QN AUTO: 26.5 PG (ref 25–35)
MCHC RBC AUTO-ENTMCNC: 31.1 G/DL (ref 31–37)
MCV RBC AUTO: 85.2 FL (ref 80–100)
MONOCYTES # BLD AUTO: 0.7 K/UL (ref 0–1)
MONOCYTES NFR BLD AUTO: 5 %
NEUTROPHILS # BLD AUTO: 10.8 K/UL (ref 1.3–7.7)
NEUTROPHILS NFR BLD AUTO: 75 %
PLATELET # BLD AUTO: 187 K/UL (ref 150–450)
POTASSIUM SERPL-SCNC: 4.7 MMOL/L (ref 3.5–5.1)
PROT SERPL-MCNC: 7.4 G/DL (ref 6.3–8.2)
SODIUM SERPL-SCNC: 136 MMOL/L (ref 137–145)
WBC # BLD AUTO: 14.4 K/UL (ref 3.8–10.6)

## 2019-08-13 PROCEDURE — 36415 COLL VENOUS BLD VENIPUNCTURE: CPT

## 2019-08-13 PROCEDURE — 80053 COMPREHEN METABOLIC PANEL: CPT

## 2019-08-13 PROCEDURE — 85025 COMPLETE CBC W/AUTO DIFF WBC: CPT

## 2019-08-13 PROCEDURE — 86850 RBC ANTIBODY SCREEN: CPT

## 2019-08-13 PROCEDURE — 86900 BLOOD TYPING SEROLOGIC ABO: CPT

## 2019-08-13 PROCEDURE — 86901 BLOOD TYPING SEROLOGIC RH(D): CPT

## 2019-08-18 NOTE — P.GSHP
History of Present Illness


H&P Date: 08/19/19











DATE OF SERVICE: 08/19/2019





CHIEF COMPLAINT: Morbid obesity 





HISTORY OF PRESENT ILLNESS:  Kareem Groves is a 64-year-old male who comes in with 

super morbid obesity.  He has chronic wound ulcers and has diabetes as a result 

of his morbid obesity.  He has limited mobility including severe lymphedema of 

the bilateral lower extremities. He is looking into gastrectomy procedures. He 

has completed cardiac and pulmonary clearance. He has chronic infections and had

elevated WBC.  He ambulates with walker, canes and wheelchair. He has lost 20+ 

pounds in 8 months. His blood pressure has improved. 





At height of 5 feet 9 inches, his ideal body weight is 168 pounds.  He comes in 

481 pounds from 495 pounds, 8 months ago.  He has lost 15 pounds in 8 months. 

His highest weight is 503 pounds. His body mass index highest was 74.4. Today 

his BMI is 71.2.  He is 312 pounds overweight.





PAST MEDICAL HISTORY: 


1.  Morbid obesity due to excess calories


2.  Body mass index of 74.4 initial


3.  Osteoarthritis of the knees.


4.  Osteoarthritis of the lower back.


5.  Hypertensive heart disease.


6.  Asthma


7.  Obstructive sleep apnea


8.  Deep venous thrombosis


9.  Pulmonary embolism


10. Rheumatoid arthritis


11. Pneumonia


12. Diabetes type 2


13.  Anxiety disorder


14.  Depressive disorder


15.  Venous stasis disease





PAST SURGICAL HISTORY: 


1.  Appendectomy


2.  Tonsillectomy


3.  Adenoidectomy


4.  Hernia repair


5.  Bilateral hip replacement


6.  Nephrectomy





HOME MEDICATIONS: 





ALLERGIES:





                     Home Medications











 Medication  Instructions  Recorded  Confirmed  Type


 


Carisoprodol [Soma] 350 mg PO TID PRN 05/01/14 10/31/18 History


 


traMADol HCL [Ultram] 50 mg PO Q8HR PRN 11/15/17 10/31/18 History


 


Albuterol Nebulized [Ventolin 2.5 mg INHALATION TID PRN 05/08/18 10/31/18 

History





Nebulized]    


 


Metoprolol Tartrate 25 mg PO BID 05/08/18 10/31/18 History


 


Rivaroxaban [Xarelto] 10 mg PO DAILY 05/08/18 10/31/18 History


 


Lisinopril [Zestril] 5 mg PO DAILY 08/23/18 10/31/18 History


 


Vit D3/Folic Acid/B2/B6/B12 1.25 tab PO Q30D 08/23/18 10/31/18 History





[Folgard Tablet]    


 


metFORMIN HCL [Glucophage] 500 mg PO BID 10/11/18 10/31/18 History











                                    Allergies











Allergy/AdvReac Type Severity Reaction Status Date / Time


 


Sulfa (Sulfonamide Allergy  throat Verified 10/31/18 15:14





Antibiotics)   swelling  

















SOCIAL HISTORY: No active tobacco use.   





FAMILY HISTORY: No family history of ulcerative colitis disease or Crohn's 

disease. Family history of morbid obesity. No lupus in the family.  No reports 

of stomach or esophageal cancer.  





REVIEW OF ORGAN SYSTEMS: 


CONSTITUTIONAL:  At height of 5 feet 9 inches, his ideal body weight is 168 

pounds. His highest weight  is 503 pounds. His body mass index highest was 74.4.

He was 327 pounds overweight.


HEENT: Denies any active troubles with vision or hearing.  No troubles with 

swallowing.  


ENDOCRINE: No diabetes. No hypothyroidism. 


CARDIOVASCULAR: No reports of palpitations or heart attacks or chest pain.  Has 

bilateral leg ulcers.


RESPIRATORY: Has daytime somnolence. Has asthma. 


GI: Denies any bright red blood per rectum.  No diarrhea. Has constipation.


MUSCULOSKELETAL: Has lower back pain and joint pain.  Has osteoarthritis of the 

knees.  History of bilateral lower extremity edema.


NEURO: No headaches. No seizure disorders. 


PSYCH: Has depression. No suicidal ideation.   


RHEUMATOLOGIC: No lupus.  No rheumatoid arthritis.  


HEMATOLOGIC: Denies any abnormal bleeding or bruising.  No personal history of 

DVTs.  


SKIN: No rash.  No skin cancer.  History of venous stasis disease.





PHYSICAL EXAM: 


VITAL SIGNS: Height 5 foot 9 inches, weight 481 pounds. BMI 71.2








GENERAL: Well-developed in no acute distress.  


HEENT: No scleral icterus.  Extraocular movements grossly intact.  Hears 

conversational speech.  No nasal drainage.


NECK: Supple without lymphadenopathy. 


CHEST: Nonlabored respirations with equal bilateral excursions. 


CARDIOVASCULAR: Regular rate and regular rhythm. Distal 2+ pulses. 


ABDOMEN: Obese, soft, nontender, nondistended. 


MUSCULOSKELETAL: No clubbing, cyanosis. Gross strength 5/5 distal lower ext

remities. Has 3+ bilateral lower extremity edema


NEURO: No focal or lateralizing signs. Cranial nerves 2 through 12 grossly 

within normal limits. 


PSYCH: Appropriate affect. Alert and oriented to person, place and time.


SKIN: Good skin turgor.  Well perfused.








ASSESSMENT: 


1.  Morbid obesity due to excess calories


2.  Body mass index of 74.4, initial


3.  Osteoarthritis of the knees.


4.  Osteoarthritis of the lower back.


5.  Hypertensive heart disease.


6.  Asthma


7.  Obstructive sleep apnea


8.  Deep venous thrombosis


9.  Pulmonary embolism


10. Rheumatoid arthritis


11. Pneumonia


12. Diabetes type 2


13.  Anxiety disorder


14.  Depressive disorder


15.  Venous stasis disease


16.  Leukocytosis





PLAN: 


1. Bariatric options between a sleeve, band and a Marvin-en-Y gastric bypass were 

reviewed in detail. The patient elected for a sleeve gastrectomy.  Robotic 

assisted approach described.


2. The [place] [facility] Data was also reviewed with benefits and

risks as described.  


3. An 8 page second-generation bariatric consent form was reviewed in detail 

including potential of bleeding, infection, leaks, adequate weight loss, 

nutritional deficiencies which the patient demonstrated understanding of the 

risks.


4. A 2 week high-protein low caloric 800 kcal diet described to address 

hepatomegaly.


5.  Preoperative labs including complete metabolic panel and CBC with type and 

screen recommended.


6.  DVT prophylaxis per [place] bariatric surgery collaborative.


7.  Antibiotic prophylaxis.


8.  Inpatient hospitalization anticipated for more than 2 nights.


9.  All questions and concerns were addressed with the patient.


10. Sleeve procedure consent obtained. 





Patient pre-operative labs obtained with elevated white count of 14,000.  

Patient previously notified that repeat CBC will be obtained.  If persistent 

elevated white blood cell count, case will be canceled and rescheduled.  








Past Medical History


Past Medical History: Asthma, Diabetes Mellitus, Deep Vein Thrombosis (DVT), 

Hypertension, Osteoarthritis (OA), Pneumonia, Pulmonary Embolus (PE), Rheumatoid

Arthritis (RA), Sleep Apnea/CPAP/BIPAP, Vascular Disorder


Additional Past Medical History / Comment(s): Seizure as infant only.  Uses 2 

canes, walker for distance, Scoliosis. 3/2015-DVT Rt leg and PE ronny lungs.  

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  Hx Kidney Mass w/ 

Removal.  Uses C-PAP.  Recent Bronchitis, completed AB RX, on Steroid Taper.  Hx

stasis ulcers BLE, has varicose veins.


History of Any Multi-Drug Resistant Organisms: None Reported


Past Surgical History: Adenoidectomy, Appendectomy, Hernia Repair, Joint 

Replacement, Tonsillectomy


Additional Past Surgical History / Comment(s): Ronny Hip Replacement, Nephrectomy 

left d/t mass(pt stated found to be benign), Wound clinic procedures.  EGD, 

Colonoscopy.


Past Anesthesia/Blood Transfusion Reactions: No Reported Reaction


Smoking Status: Never smoker





- Past Family History


  ** Son(s)


Additional Family Medical History / Comment(s): He has one son with morbid 

obesity.





  ** Mother


Family Medical History: Cancer


Additional Family Medical History / Comment(s): breast cancer





  ** Father


Family Medical History: Cancer, COPD, Coronary Artery Disease (CAD), 

Hypertension, Myocardial Infarction (MI)


Additional Family Medical History / Comment(s): emphysema





Medications and Allergies


                                Home Medications











 Medication  Instructions  Recorded  Confirmed  Type


 


Carisoprodol [Soma] 350 mg PO TID PRN 05/01/14 08/08/19 History


 


traMADol HCL [Ultram] 50 mg PO Q8HR PRN 11/15/17 08/08/19 History


 


Albuterol Nebulized [Ventolin 2.5 mg INHALATION TID PRN 05/08/18 08/08/19 

History





Nebulized]    


 


Metoprolol Tartrate 25 mg PO BID 05/08/18 08/08/19 History


 


Rivaroxaban [Xarelto] 10 mg PO DAILY 05/08/18 08/08/19 History


 


Lisinopril [Zestril] 5 mg PO DAILY 08/23/18 08/08/19 History


 


Multivitamin with Iron 1 each PO DAILY 12/03/18 08/08/19 History





[Multivitamins with Iron]    


 


metFORMIN HCL [metFORMIN HCL ER] 1,000 mg PO BID 12/03/18 08/08/19 History


 


Ergocalciferol (Vitamin D2) 50,000 unit PO Q7D 08/08/19 08/08/19 History





[Vitamin D2]    


 


Furosemide [Lasix] 40 mg PO DAILY PRN 08/08/19 08/08/19 History


 


predniSONE 30 mg PO DAILY 08/08/19 08/08/19 History








                                    Allergies











Allergy/AdvReac Type Severity Reaction Status Date / Time


 


honey Allergy  burning Verified 08/08/19 17:18





[From MediHoney (honey)]   pain,  


 


Sulfa (Sulfonamide Allergy  throat Verified 08/08/19 17:18





Antibiotics)   swelling

## 2019-08-19 ENCOUNTER — HOSPITAL ENCOUNTER (OUTPATIENT)
Dept: HOSPITAL 47 - 2ORMAIN | Age: 64
Discharge: HOME | End: 2019-08-19
Payer: MEDICARE

## 2019-08-19 VITALS
TEMPERATURE: 97.8 F | SYSTOLIC BLOOD PRESSURE: 131 MMHG | DIASTOLIC BLOOD PRESSURE: 64 MMHG | RESPIRATION RATE: 18 BRPM | HEART RATE: 72 BPM

## 2019-08-19 VITALS — BODY MASS INDEX: 70.9 KG/M2

## 2019-08-19 DIAGNOSIS — Z87.01: ICD-10-CM

## 2019-08-19 DIAGNOSIS — Z88.2: ICD-10-CM

## 2019-08-19 DIAGNOSIS — Z82.5: ICD-10-CM

## 2019-08-19 DIAGNOSIS — D72.829: ICD-10-CM

## 2019-08-19 DIAGNOSIS — F41.9: ICD-10-CM

## 2019-08-19 DIAGNOSIS — E66.01: Primary | ICD-10-CM

## 2019-08-19 DIAGNOSIS — G47.33: ICD-10-CM

## 2019-08-19 DIAGNOSIS — M41.9: ICD-10-CM

## 2019-08-19 DIAGNOSIS — I11.9: ICD-10-CM

## 2019-08-19 DIAGNOSIS — Z79.84: ICD-10-CM

## 2019-08-19 DIAGNOSIS — M17.0: ICD-10-CM

## 2019-08-19 DIAGNOSIS — E11.9: ICD-10-CM

## 2019-08-19 DIAGNOSIS — Z82.49: ICD-10-CM

## 2019-08-19 DIAGNOSIS — J45.909: ICD-10-CM

## 2019-08-19 DIAGNOSIS — M06.9: ICD-10-CM

## 2019-08-19 DIAGNOSIS — M47.9: ICD-10-CM

## 2019-08-19 DIAGNOSIS — Z90.5: ICD-10-CM

## 2019-08-19 DIAGNOSIS — Z96.643: ICD-10-CM

## 2019-08-19 DIAGNOSIS — I87.8: ICD-10-CM

## 2019-08-19 DIAGNOSIS — Z86.711: ICD-10-CM

## 2019-08-19 DIAGNOSIS — Z80.3: ICD-10-CM

## 2019-08-19 DIAGNOSIS — Z79.01: ICD-10-CM

## 2019-08-19 DIAGNOSIS — F32.9: ICD-10-CM

## 2019-08-19 DIAGNOSIS — Z79.899: ICD-10-CM

## 2019-08-19 DIAGNOSIS — Z53.09: ICD-10-CM

## 2019-08-19 DIAGNOSIS — Z86.718: ICD-10-CM

## 2019-08-19 DIAGNOSIS — I89.0: ICD-10-CM

## 2019-08-19 LAB
ALBUMIN SERPL-MCNC: 3.6 G/DL (ref 3.5–5)
ALP SERPL-CCNC: 65 U/L (ref 38–126)
ALT SERPL-CCNC: 30 U/L (ref 21–72)
ANION GAP SERPL CALC-SCNC: 13 MMOL/L
AST SERPL-CCNC: 25 U/L (ref 17–59)
BASOPHILS # BLD AUTO: 0.1 K/UL (ref 0–0.2)
BASOPHILS NFR BLD AUTO: 1 %
BUN SERPL-SCNC: 20 MG/DL (ref 9–20)
CALCIUM SPEC-MCNC: 9.1 MG/DL (ref 8.4–10.2)
CHLORIDE SERPL-SCNC: 104 MMOL/L (ref 98–107)
CO2 SERPL-SCNC: 21 MMOL/L (ref 22–30)
EOSINOPHIL # BLD AUTO: 0.2 K/UL (ref 0–0.7)
EOSINOPHIL NFR BLD AUTO: 2 %
ERYTHROCYTE [DISTWIDTH] IN BLOOD BY AUTOMATED COUNT: 5.68 M/UL (ref 4.3–5.9)
ERYTHROCYTE [DISTWIDTH] IN BLOOD: 15.9 % (ref 11.5–15.5)
GLUCOSE BLD-MCNC: 84 MG/DL (ref 75–99)
GLUCOSE SERPL-MCNC: 88 MG/DL (ref 74–99)
HCT VFR BLD AUTO: 47.2 % (ref 39–53)
HGB BLD-MCNC: 15.1 GM/DL (ref 13–17.5)
LYMPHOCYTES # SPEC AUTO: 2.1 K/UL (ref 1–4.8)
LYMPHOCYTES NFR SPEC AUTO: 17 %
MCH RBC QN AUTO: 26.7 PG (ref 25–35)
MCHC RBC AUTO-ENTMCNC: 32 G/DL (ref 31–37)
MCV RBC AUTO: 83.2 FL (ref 80–100)
MONOCYTES # BLD AUTO: 0.6 K/UL (ref 0–1)
MONOCYTES NFR BLD AUTO: 5 %
NEUTROPHILS # BLD AUTO: 9.4 K/UL (ref 1.3–7.7)
NEUTROPHILS NFR BLD AUTO: 75 %
PLATELET # BLD AUTO: 218 K/UL (ref 150–450)
POTASSIUM SERPL-SCNC: 4.5 MMOL/L (ref 3.5–5.1)
PROT SERPL-MCNC: 7.3 G/DL (ref 6.3–8.2)
SODIUM SERPL-SCNC: 138 MMOL/L (ref 137–145)
WBC # BLD AUTO: 12.5 K/UL (ref 3.8–10.6)

## 2019-08-19 PROCEDURE — 85025 COMPLETE CBC W/AUTO DIFF WBC: CPT

## 2019-08-19 PROCEDURE — 86900 BLOOD TYPING SEROLOGIC ABO: CPT

## 2019-08-19 PROCEDURE — 86901 BLOOD TYPING SEROLOGIC RH(D): CPT

## 2019-08-19 PROCEDURE — 86850 RBC ANTIBODY SCREEN: CPT

## 2019-08-19 PROCEDURE — 80053 COMPREHEN METABOLIC PANEL: CPT

## 2019-08-19 NOTE — P.PN
Progress Note - Text


Progress Note Date: 08/19/19





Patient repeat CBC elevated over 12,000.  He reports recent bronchitis and being

placed on steroids recently discontinued 3 days ago.  As he is already baseline 

high surgical risk,case is cancelled.  





He will need at minimum 30 days off steroids for sleeve gastrectomy.  Patient 

will be reassessed.  All questions addressed.  Patient understanding of the 

findings.

## 2019-08-21 NOTE — P.DS
Providers


Date of admission: 


08/19/19 07:57





Expected date of discharge: 08/19/19


Attending physician: 


Naz Lao





Primary care physician: 


Doris Edwards








- Discharge Diagnosis(es)


(1) Morbid obesity with BMI of 60.0-69.9, adult


Status: Acute   


Hospital Course: 





 Case was canceled.  He was not admitted to the hospital.  He was discharged 

immediately.





Plan - Discharge Summary


Discharge Rx Participant: No


New Discharge Prescriptions: 


No Action


   Carisoprodol [Soma] 350 mg PO TID PRN


     PRN Reason: Pain


   traMADol HCL [Ultram] 50 mg PO Q8HR PRN


     PRN Reason: Pain


   Rivaroxaban [Xarelto] 10 mg PO DAILY


   Metoprolol Tartrate 25 mg PO BID


   Albuterol Nebulized [Ventolin Nebulized] 2.5 mg INHALATION TID PRN


     PRN Reason: sob


   Lisinopril [Zestril] 5 mg PO DAILY


   metFORMIN HCL [metFORMIN HCL ER] 1,000 mg PO BID


   Multivitamin with Iron [Multivitamins with Iron] 1 each PO DAILY


   predniSONE 30 mg PO DAILY


   Furosemide [Lasix] 40 mg PO DAILY PRN


     PRN Reason: Edema


   Ergocalciferol (Vitamin D2) [Vitamin D2] 50,000 unit PO Q7D


Discharge Medication List





Carisoprodol [Soma] 350 mg PO TID PRN 05/01/14 [History]


traMADol HCL [Ultram] 50 mg PO Q8HR PRN 11/15/17 [History]


Albuterol Nebulized [Ventolin Nebulized] 2.5 mg INHALATION TID PRN 05/08/18 

[History]


Metoprolol Tartrate 25 mg PO BID 05/08/18 [History]


Rivaroxaban [Xarelto] 10 mg PO DAILY 05/08/18 [History]


Lisinopril [Zestril] 5 mg PO DAILY 08/23/18 [History]


Multivitamin with Iron [Multivitamins with Iron] 1 each PO DAILY 12/03/18 

[History]


metFORMIN HCL [metFORMIN HCL ER] 1,000 mg PO BID 12/03/18 [History]


Ergocalciferol (Vitamin D2) [Vitamin D2] 50,000 unit PO Q7D 08/08/19 [History]


Furosemide [Lasix] 40 mg PO DAILY PRN 08/08/19 [History]


predniSONE 30 mg PO DAILY 08/08/19 [History]








Discharge Disposition: HOME SELF-CARE

## 2019-08-29 ENCOUNTER — HOSPITAL ENCOUNTER (INPATIENT)
Dept: HOSPITAL 47 - EC | Age: 64
LOS: 1 days | Discharge: HOME | DRG: 309 | End: 2019-08-30
Attending: INTERNAL MEDICINE | Admitting: INTERNAL MEDICINE
Payer: MEDICARE

## 2019-08-29 DIAGNOSIS — Z87.01: ICD-10-CM

## 2019-08-29 DIAGNOSIS — M06.9: ICD-10-CM

## 2019-08-29 DIAGNOSIS — Z79.01: ICD-10-CM

## 2019-08-29 DIAGNOSIS — Z91.018: ICD-10-CM

## 2019-08-29 DIAGNOSIS — D72.829: ICD-10-CM

## 2019-08-29 DIAGNOSIS — G47.30: ICD-10-CM

## 2019-08-29 DIAGNOSIS — F32.9: ICD-10-CM

## 2019-08-29 DIAGNOSIS — Z96.643: ICD-10-CM

## 2019-08-29 DIAGNOSIS — I83.90: ICD-10-CM

## 2019-08-29 DIAGNOSIS — Z88.2: ICD-10-CM

## 2019-08-29 DIAGNOSIS — E66.01: ICD-10-CM

## 2019-08-29 DIAGNOSIS — Z79.899: ICD-10-CM

## 2019-08-29 DIAGNOSIS — R06.09: ICD-10-CM

## 2019-08-29 DIAGNOSIS — I27.20: ICD-10-CM

## 2019-08-29 DIAGNOSIS — Z82.5: ICD-10-CM

## 2019-08-29 DIAGNOSIS — M41.9: ICD-10-CM

## 2019-08-29 DIAGNOSIS — Z82.49: ICD-10-CM

## 2019-08-29 DIAGNOSIS — J45.909: ICD-10-CM

## 2019-08-29 DIAGNOSIS — M81.0: ICD-10-CM

## 2019-08-29 DIAGNOSIS — E83.42: ICD-10-CM

## 2019-08-29 DIAGNOSIS — Z86.718: ICD-10-CM

## 2019-08-29 DIAGNOSIS — I42.9: ICD-10-CM

## 2019-08-29 DIAGNOSIS — I11.9: ICD-10-CM

## 2019-08-29 DIAGNOSIS — F41.9: ICD-10-CM

## 2019-08-29 DIAGNOSIS — E11.51: ICD-10-CM

## 2019-08-29 DIAGNOSIS — Z79.84: ICD-10-CM

## 2019-08-29 DIAGNOSIS — Z86.711: ICD-10-CM

## 2019-08-29 DIAGNOSIS — M19.90: ICD-10-CM

## 2019-08-29 DIAGNOSIS — Z90.5: ICD-10-CM

## 2019-08-29 DIAGNOSIS — I48.0: Primary | ICD-10-CM

## 2019-08-29 DIAGNOSIS — Z80.3: ICD-10-CM

## 2019-08-29 DIAGNOSIS — Z90.49: ICD-10-CM

## 2019-08-29 LAB
ALBUMIN SERPL-MCNC: 3.2 G/DL (ref 3.5–5)
ALP SERPL-CCNC: 68 U/L (ref 38–126)
ALT SERPL-CCNC: 26 U/L (ref 21–72)
ANION GAP SERPL CALC-SCNC: 8 MMOL/L
APTT BLD: 25 SEC (ref 22–30)
AST SERPL-CCNC: 18 U/L (ref 17–59)
BASOPHILS # BLD AUTO: 0.1 K/UL (ref 0–0.2)
BASOPHILS NFR BLD AUTO: 0 %
BUN SERPL-SCNC: 12 MG/DL (ref 9–20)
CALCIUM SPEC-MCNC: 8.5 MG/DL (ref 8.4–10.2)
CHLORIDE SERPL-SCNC: 103 MMOL/L (ref 98–107)
CO2 SERPL-SCNC: 26 MMOL/L (ref 22–30)
EOSINOPHIL # BLD AUTO: 0.2 K/UL (ref 0–0.7)
EOSINOPHIL NFR BLD AUTO: 2 %
ERYTHROCYTE [DISTWIDTH] IN BLOOD BY AUTOMATED COUNT: 5.1 M/UL (ref 4.3–5.9)
ERYTHROCYTE [DISTWIDTH] IN BLOOD: 15.9 % (ref 11.5–15.5)
GLUCOSE SERPL-MCNC: 131 MG/DL (ref 74–99)
HCT VFR BLD AUTO: 43.7 % (ref 39–53)
HGB BLD-MCNC: 13.9 GM/DL (ref 13–17.5)
INR PPP: 0.9 (ref ?–1.2)
LYMPHOCYTES # SPEC AUTO: 1.9 K/UL (ref 1–4.8)
LYMPHOCYTES NFR SPEC AUTO: 16 %
MAGNESIUM SPEC-SCNC: 1.5 MG/DL (ref 1.6–2.3)
MCH RBC QN AUTO: 27.3 PG (ref 25–35)
MCHC RBC AUTO-ENTMCNC: 31.8 G/DL (ref 31–37)
MCV RBC AUTO: 85.8 FL (ref 80–100)
MONOCYTES # BLD AUTO: 0.6 K/UL (ref 0–1)
MONOCYTES NFR BLD AUTO: 5 %
NEUTROPHILS # BLD AUTO: 9.1 K/UL (ref 1.3–7.7)
NEUTROPHILS NFR BLD AUTO: 76 %
PLATELET # BLD AUTO: 143 K/UL (ref 150–450)
POTASSIUM SERPL-SCNC: 4.3 MMOL/L (ref 3.5–5.1)
PROT SERPL-MCNC: 6.7 G/DL (ref 6.3–8.2)
PT BLD: 9.9 SEC (ref 9–12)
SODIUM SERPL-SCNC: 137 MMOL/L (ref 137–145)
WBC # BLD AUTO: 11.9 K/UL (ref 3.8–10.6)

## 2019-08-29 PROCEDURE — 80053 COMPREHEN METABOLIC PANEL: CPT

## 2019-08-29 PROCEDURE — 93306 TTE W/DOPPLER COMPLETE: CPT

## 2019-08-29 PROCEDURE — 96366 THER/PROPH/DIAG IV INF ADDON: CPT

## 2019-08-29 PROCEDURE — 93005 ELECTROCARDIOGRAM TRACING: CPT

## 2019-08-29 PROCEDURE — 96368 THER/DIAG CONCURRENT INF: CPT

## 2019-08-29 PROCEDURE — 83735 ASSAY OF MAGNESIUM: CPT

## 2019-08-29 PROCEDURE — 85025 COMPLETE CBC W/AUTO DIFF WBC: CPT

## 2019-08-29 PROCEDURE — 84484 ASSAY OF TROPONIN QUANT: CPT

## 2019-08-29 PROCEDURE — 85730 THROMBOPLASTIN TIME PARTIAL: CPT

## 2019-08-29 PROCEDURE — 85610 PROTHROMBIN TIME: CPT

## 2019-08-29 PROCEDURE — 84443 ASSAY THYROID STIM HORMONE: CPT

## 2019-08-29 PROCEDURE — 36415 COLL VENOUS BLD VENIPUNCTURE: CPT

## 2019-08-29 PROCEDURE — 71046 X-RAY EXAM CHEST 2 VIEWS: CPT

## 2019-08-29 PROCEDURE — 99285 EMERGENCY DEPT VISIT HI MDM: CPT

## 2019-08-29 PROCEDURE — 96365 THER/PROPH/DIAG IV INF INIT: CPT

## 2019-08-29 PROCEDURE — 96376 TX/PRO/DX INJ SAME DRUG ADON: CPT

## 2019-08-29 RX ADMIN — CEFAZOLIN SCH MLS/HR: 330 INJECTION, POWDER, FOR SOLUTION INTRAMUSCULAR; INTRAVENOUS at 17:55

## 2019-08-29 RX ADMIN — MAGNESIUM SULFATE IN DEXTROSE SCH MLS/HR: 10 INJECTION, SOLUTION INTRAVENOUS at 14:32

## 2019-08-29 RX ADMIN — MAGNESIUM SULFATE IN DEXTROSE SCH MLS/HR: 10 INJECTION, SOLUTION INTRAVENOUS at 17:58

## 2019-08-29 RX ADMIN — ATORVASTATIN CALCIUM SCH MG: 40 TABLET, FILM COATED ORAL at 20:33

## 2019-08-29 RX ADMIN — AMIODARONE HYDROCHLORIDE SCH MG: 200 TABLET ORAL at 20:34

## 2019-08-29 RX ADMIN — METOPROLOL TARTRATE SCH MG: 25 TABLET, FILM COATED ORAL at 20:34

## 2019-08-29 RX ADMIN — DILTIAZEM HYDROCHLORIDE SCH MLS/HR: 5 INJECTION INTRAVENOUS at 11:32

## 2019-08-29 NOTE — ECHOF
Referral Reason:



MEASUREMENTS

--------

HEIGHT: 170.2 cm

WEIGHT: 204.1 kg

BP: 102/69

RVIDd:   3.1 cm     (< 3.3)

IVSd:   1.6 cm     (0.6 - 1.1)

LVIDd:   3.3 cm     (3.9 - 5.3)

LVPWd:   1.8 cm     (0.6 - 1.1)

IVSs:   1.6 cm

LVIDs:   2.8 cm

LVPWs:   1.4 cm

Ao Diam:   3.8 cm     (2.0 - 3.7)

AV Cusp:   2.0 cm     (1.5 - 2.6)

LA Diam:   3.3 cm     (2.7 - 3.8)

RAP:   5.00 mmHg

RVSP:   8.53 mmHg







FINDINGS

--------

Atrial fibrillation.

This was a technically difficult study with suboptimal views.

The left ventricular size is normal.   There is severe concentric left ventricular hypertrophy.   Ove
rall left ventricular systolic function is moderate-severely impaired with, an EF between 30 - 35 %.

The right ventricle is normal in size.

, and the LA measures 3.3cm.

The right atrial size is normal.

The aortic valve is trileaflet and appears structurally normal.

The mitral valve is normal.   The mitral valve leaflets are mildly thickened.   Mild mitral annular c
alcification present.   There is trace mitral regurgitation.

The tricuspid valve appears structurally normal.   Trace tricuspid regurgitation present.   Right alysa
tricular systolic pressure is normal at < 35 mmHg.

There is no pulmonic regurgitation present.

The aortic root size is normal.

IVC Not well visulized.

There is no pericardial effusion.

Lumason used



CONCLUSIONS

--------

1. Atrial fibrillation.

2. This was a technically difficult study with suboptimal views.

3. The left ventricular size is normal.

4. There is severe concentric left ventricular hypertrophy.

5. Overall left ventricular systolic function is moderate-severely impaired with, an EF between 30 - 
35 %.

6. The right ventricle is normal in size.

7. , and the LA measures 3.3cm.

8. The right atrial size is normal.

9. Lumason used

10. The aortic valve is trileaflet and appears structurally normal.

11. The mitral valve is normal.

12. The mitral valve leaflets are mildly thickened.

13. Mild mitral annular calcification present.

14. There is trace mitral regurgitation.

15. The tricuspid valve appears structurally normal.

16. Trace tricuspid regurgitation present.

17. Right ventricular systolic pressure is normal at < 35 mmHg.

18. There is no pulmonic regurgitation present.

19. The aortic root size is normal.

20. IVC Not well visulized.

21. There is no pericardial effusion.





SONOGRAPHER: Parris Diaz RDCS

## 2019-08-29 NOTE — CONS
CONSULTATION



Mr. Groves is a 64-year-old male with known history of hypertension, history of

diabetes, peripheral vascular disease, morbid obesity, who presented to Dr. Edwards

today on a routine visit and was found to have evidence of atrial fibrillation.  The

patient is unaware of the arrhythmia.  He does not feel palpitations.  He does not feel

any change in his breathing.  His activity is limited.  He has mild to moderate chronic

dyspnea on exertion, unchanged.  He has a history of cellulitis and has been followed

in the past in that regard.  He also has a history of deep vein thrombosis, for which

he has been anticoagulated.  In the past, his ejection fraction was mildly to

moderately impaired with ejection fraction of 40% to 50%. His last echocardiogram was

in June of 2018 that showed ejection fraction 50%.  He has evidence of pulmonary

hypertension and left ventricular hypertrophy.  He has been evaluated by Dr. Lao

for bariatric surgery, but his surgery has been postponed because of multiple issues,

some of them related to insurance and some of them related to leukocytosis.  He denies

any chest discomfort.  He denies any palpitations.  He denies any change in his overall

activity.



MEDICATION:

His medications at home included:

1. Xarelto 10 mg daily.

2. Metformin 1 gram twice a day.

3. Metoprolol tartrate 25 mg twice a day.

4. Zestril 5 mg daily.

5. Furosemide on a p.r.n. basis.

6. Vitamin D.

7. Soma.

8. Ventolin.



REVIEW OF SYSTEMS:

RESPIRATORY SYSTEM: He has chronic dyspnea on exertion. No recent wheezing or cough.

GI SYSTEM: No recent GI bleeding.  No peptic ulcer disease.

 SYSTEM: No dysuria or hematuria.

NERVOUS SYSTEM: No history of seizure.



PHYSICAL EXAMINATION:

This is a 64-year-old male, morbidly obese, alert, oriented, in no apparent distress.

Blood pressure 102/60 with a heart rate in the 120s.

HEAD:  Normocephalic.

EYES: Sclerae anicteric.

NECK: Good carotid upstroke. No bruit.

LUNGS: Decreased air exchange bilaterally. No wheezes.

HEART: Irregularly irregular. S1, S2.  No S3.  No rub appreciated.

ABDOMEN:  Soft, obese, nontender.

EXTREMITIES:  Chronic skin changes and edema noted.



LAB DATA/IMAGING:

Lab data revealed hemoglobin of 13.9, white blood cells 11.9, BUN and creatinine or 12

and 0.87.  Troponin less than 0.012.  TSH 1.730.



EKG revealed atrial fibrillation with a rapid ventricular response and nonspecific ST-T

wave changes.



Chest x-ray shows no acute infiltrate.



IMPRESSION:

1. Atrial fibrillation of unknown duration.  The patient is asymptomatic in that

    regard.

2. Morbid obesity.

3. Hypertension.

4. Diabetes mellitus.

5. History of cellulitis.

6. Mild cardiomyopathy.



RECOMMENDATIONS:

I will increase the dose of his beta blocker.  Patient is on IV Cardizem.  I will add

amiodarone to his regimen to see if he converts back to sinus mechanism.  I will

increase the dose of his Xarelto.  Will obtain echocardiogram with Doppler.  If he

sustains atrial fibrillation, we will control the rate and, depending on his status, we

will make a decision if cardioversion is needed to restore sinus mechanism.  I have

discussed those findings with the patient in detail.  He is in full understanding and

agreement.



Thank you for this consult.  Will follow with you.





CAMELIA / IJN: [id number] / Job#: [id number]

## 2019-08-29 NOTE — XR
EXAMINATION TYPE: XR chest 2V

 

DATE OF EXAM: 8/29/2019

 

COMPARISON: Prior chest x-ray March 25, 2016

 

HISTORY: Abnormal EKG, dysrhythmia.

 

TECHNIQUE:  Frontal and lateral views of the chest are obtained.

 

FINDINGS: Eventration of right hemidiaphragm is redemonstrated. Exam slightly suboptimal technique du
e to patient's large body habitus particularly lateral image. There is some chronic parenchymal moncada
es bilaterally without suspicious new focal air space opacity, pleural effusion, or pneumothorax seen
.  The cardiac silhouette size is stable and upper limits of normal.  Bilateral hilar prominence sugg
estive of underlying pulmonary artery hypertension is redemonstrated. Focal left lateral rib deformit
y along the course of the posterior lateral seventh rib unchanged from prior.

 

IMPRESSION:  No acute process.

## 2019-08-29 NOTE — P.HPIM
History of Present Illness


H&P Date: 08/29/19





This is a 64-year-old male patient who presented to the ER with complaints of 

new onset atrial fibrillation with RVR.  Patient reports that he went to a 

follow-up appointment with his PCP where it was found that he had an irregular 

heartbeat EKG was completed showing patient in A. fib with RVR.  Patient reports

he has not had any symptoms including chest pain shortness of breath or fatigue.

 Patient does have past medical history of asthma, DVT, hypertension, 

osteoporosis, pneumonia, pulmonary embolism, rheumatoid arthritis, sleep apnea, 

anxiety, depression and obesity.  Patient is currently on Xarelto for DVT and PE

that occurred 3 years ago.  Chest x-ray completed showing no acute process.  EKG

completed showing atrial fibrillation with rapid ventricular response heart rate

of 141.  Patient was started on Cardizem drip cardiology services have been 

consulted.  At this time patient denies any chest pain or shortness breath.  

Patient denies nausea vomiting or diarrhea.  Patient denies any urinary 

frequency. 





Review of Systems





Please refer to HPI otherwise unremarkable





Past Medical History


Past Medical History: Asthma, Diabetes Mellitus, Deep Vein Thrombosis (DVT), 

Hypertension, Osteoarthritis (OA), Pneumonia, Pulmonary Embolus (PE), Rheumatoid

Arthritis (RA), Sleep Apnea/CPAP/BIPAP, Vascular Disorder


Additional Past Medical History / Comment(s): Seizure as infant only.  Uses 2 

canes, walker for distance, Scoliosis. 3/2015-DVT Rt leg and PE ronny lungs.  

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  Hx Kidney Mass w/ 

Removal.  Uses C-PAP.  Recent Bronchitis, completed AB RX, on Steroid Taper.  Hx

stasis ulcers BLE, has varicose veins.


History of Any Multi-Drug Resistant Organisms: None Reported


Past Surgical History: Adenoidectomy, Appendectomy, Hernia Repair, Joint 

Replacement, Tonsillectomy


Additional Past Surgical History / Comment(s): Ronny Hip Replacement, Nephrectomy 

left d/t mass(pt stated found to be benign), Wound clinic procedures.  EGD, 

Colonoscopy.


Past Anesthesia/Blood Transfusion Reactions: No Reported Reaction


Past Psychological History: Anxiety, Depression


Smoking Status: Never smoker





- Past Family History


  ** Son(s)


Additional Family Medical History / Comment(s): He has one son with morbid 

obesity.





  ** Mother


Family Medical History: Cancer


Additional Family Medical History / Comment(s): breast cancer





  ** Father


Family Medical History: Cancer, COPD, Coronary Artery Disease (CAD), 

Hypertension, Myocardial Infarction (MI)


Additional Family Medical History / Comment(s): emphysema





Medications and Allergies


                                Home Medications











 Medication  Instructions  Recorded  Confirmed  Type


 


Carisoprodol [Soma] 350 mg PO TID PRN 05/01/14 08/29/19 History


 


traMADol HCL [Ultram] 50 mg PO Q8HR PRN 11/15/17 08/29/19 History


 


Albuterol Nebulized [Ventolin 2.5 mg INHALATION TID PRN 05/08/18 08/29/19 

History





Nebulized]    


 


Metoprolol Tartrate 25 mg PO BID 05/08/18 08/29/19 History


 


Rivaroxaban [Xarelto] 10 mg PO DAILY 05/08/18 08/29/19 History


 


Lisinopril [Zestril] 5 mg PO DAILY 08/23/18 08/29/19 History


 


Multivitamin with Iron 1 tab PO DAILY 12/03/18 08/29/19 History





[Multivitamins with Iron]    


 


metFORMIN HCL [metFORMIN HCL ER] 1,000 mg PO BID 12/03/18 08/29/19 History


 


Ergocalciferol (Vitamin D2) 50,000 unit PO Q30D 08/08/19 08/29/19 History





[Vitamin D2]    


 


Furosemide [Lasix] 40 mg PO DAILY PRN 08/08/19 08/29/19 History








                                    Allergies











Allergy/AdvReac Type Severity Reaction Status Date / Time


 


honey Allergy  burning Verified 08/29/19 11:14





[From MediHoney (honey)]   pain,  


 


Sulfa (Sulfonamide Allergy  throat Verified 08/29/19 11:14





Antibiotics)   swelling  














Physical Exam


Vitals: 


                                   Vital Signs











  Temp Pulse Pulse Resp BP Pulse Ox


 


 08/29/19 13:30   124 H   15  102/69 


 


 08/29/19 13:00   152 H   16  81/63 


 


 08/29/19 12:30     18  86/73  94 L


 


 08/29/19 12:00   137 H   19  94/61  77 L


 


 08/29/19 11:50   120 H   17  94/61  96


 


 08/29/19 11:40   141 H   16  99/77  90 L


 


 08/29/19 11:39   144 H   18  99/77  93 L


 


 08/29/19 11:09   147 H   16   98


 


 08/29/19 11:07    141 H   


 


 08/29/19 10:50  98.7 F  144 H   24  106/62  94 L








                                Intake and Output











 08/28/19 08/29/19 08/29/19





 22:59 06:59 14:59


 


Other:   


 


  Weight   204.117 kg














Head normocephalic


Neck supple


Lungs clear to auscultation bilaterally no wheezing or crackles


Heart irregular rate and rhythm  no rub or gallop


Abdomen is soft nontender nondistended positive bowel sounds no hepatosplenomega

ly


Extremities bilateral lower extremity erythema.  Patient reports chronic 


Neuro alert and orientated to 3





Results


CBC & Chem 7: 


                                 08/29/19 11:04





                                 08/29/19 11:04


Labs: 


                  Abnormal Lab Results - Last 24 Hours (Table)











  08/29/19 08/29/19 Range/Units





  11:04 11:04 


 


WBC  11.9 H   (3.8-10.6)  k/uL


 


RDW  15.9 H   (11.5-15.5)  %


 


Plt Count  143 L   (150-450)  k/uL


 


Neutrophils #  9.1 H   (1.3-7.7)  k/uL


 


Glucose   131 H  (74-99)  mg/dL


 


Magnesium   1.5 L  (1.6-2.3)  mg/dL


 


Albumin   3.2 L  (3.5-5.0)  g/dL














Assessment and Plan


Assessment: 





1.  New onset atrial fibrillation with rapid ventricular response.  EKG 

completed showing atrial fibrillation with rapid ventricular response.  

Nonspecific ST abnormality probably digitalis effect.  TSH level has been 

ordered.  Patient started on Cardizem drip cardiology services have been 

consulted





2.  Hypomagnesemia.  Magnesium 1.5 replacement protocol





3.  History of asthma.  No exacerbation at this time





4.  History of DVT and PE personally 3 years ago.  Patient is maintained on 

xeralto.  home Xeralot dose has been reordered





5.  History of essential hypertension





6.  History of osteoarthritis





7.  History of sleep apnea





8.  History of rheumatoid arthritis





9.  History of anxiety and depression





DVT prophylaxis xeralto.  GI prophylaxis Pepcid


Time with Patient: Greater than 30 (Greater than 60% of the total time spent in 

counseling and coordination of care. I performed an examination of the patient 

and discussed their management with the Nurse Practitioner.  I have reviewed the

Nurse Practitioner's notes and agree with the documented findings and plan of 

care)

## 2019-08-29 NOTE — ED
General Adult HPI





- General


Chief complaint: Shortness of Breath


Stated complaint: abn EKG


Time Seen by Provider: 08/29/19 10:57


Source: patient


Mode of arrival: wheelchair


Limitations: no limitations





- History of Present Illness


Initial comments: 





Dictation was produced using dragon dictation software. please excuse any 

grammatical, word or spelling errors. 





Chief Complaint: 64-year-old male with past medical history of asthma and 

diabetes DVT pneumonia.  He presents with atrial fibrillation with rapid 

ventricular rate.





History of Present Illness: 64-year-old female has no past medical history of 

atrial fibrillation.  He was seen at his primary care office today for routine 

checkup.  He is found to have atrial fibrillation rapid ventricular rate.  He 

was seen at his PCPs office is found have high heart rate.  EKG done at Dr. Edwards's office showing atrial fibrillation with rapid ventricular rate.  

Patient does not have any history of A. fib.  He is on Xarelto for DVT/PE 

prophylaxis.  He has been on Xarelto for approximately 3 years.  Patient does 

not know when his symptoms started.  He feels well does not feel he is having 

significant palpitations.








The ROS documented in this emergency department record has been reviewed and 

confirmed by me.  Those systems with pertinent positive or negative responses h

ave been documented in the HPI.  All other systems are other negative and/or 

noncontributory.








PHYSICAL EXAM:


General Impression: Alert and oriented x3, not in acute distress, obese


HEENT: Normocephalic atraumatic, extra-ocular movements intact, pupils equal and

reactive to light bilaterally, mucous membranes moist.


Cardiovascular: Irregularly irregular


Chest: Lungs clear to auscultation bilaterally, no rhonchi, no wheeze, no rales


Abdomen: Bowel sounds present, abdomen soft, non-tender, non-distended, no 

organomegaly


Musculoskeletal: Pulses present and equal in all extremities, no peripheral 

edema


Motor:  no focal deficits noted


Neurological: CN II-XII grossly intact, no focal motor or sensory deficits noted


Skin: Intact with no visualized rashes


Psych: Normal affect and mood





ED course: 64-year-old male with new onset atrial fibrillation.  He does have A.

fib with RVR.  As upon arrival shows heart rate of 144, rest of vital signs 

within acceptable limits.


Patient started on Cardizem drip after Cardizem bolus from bag.  Laboratory 

evaluation obtained showing mild symptoms 11.9 likely secondary stress.  Coag 

panel is unremarkable.  Metabolic panel shows magnesium 1.5, rest of labs 

unremarkable.  Chest x-ray is nonacute.  Patient's heart rate is slowly 

decreasing.  We will admit patient to cardiac telemetry with cardiology 

consultation.  Patient is currently on Xarelto.  No need for starting heparin at

this time.  We will continue Xarelto.





EKG interpretation: Ventricular rate 141, A. fib with RVR, , QTc 533. No 

FL prolongation, no QTC prolongation, no ST or T-wave changes noted. 





- Related Data


                                Home Medications











 Medication  Instructions  Recorded  Confirmed


 


Carisoprodol [Soma] 350 mg PO TID PRN 05/01/14 08/29/19


 


traMADol HCL [Ultram] 50 mg PO Q8HR PRN 11/15/17 08/29/19


 


Albuterol Nebulized [Ventolin 2.5 mg INHALATION TID PRN 05/08/18 08/29/19





Nebulized]   


 


Metoprolol Tartrate 25 mg PO BID 05/08/18 08/29/19


 


Rivaroxaban [Xarelto] 10 mg PO DAILY 05/08/18 08/29/19


 


Lisinopril [Zestril] 5 mg PO DAILY 08/23/18 08/29/19


 


Multivitamin with Iron 1 tab PO DAILY 12/03/18 08/29/19





[Multivitamins with Iron]   


 


metFORMIN HCL [metFORMIN HCL ER] 1,000 mg PO BID 12/03/18 08/29/19


 


Ergocalciferol (Vitamin D2) 50,000 unit PO Q30D 08/08/19 08/29/19





[Vitamin D2]   


 


Furosemide [Lasix] 40 mg PO DAILY PRN 08/08/19 08/29/19











                                    Allergies











Allergy/AdvReac Type Severity Reaction Status Date / Time


 


honey Allergy  burning Verified 08/29/19 11:14





[From MediHoney (honey)]   pain,  


 


Sulfa (Sulfonamide Allergy  throat Verified 08/29/19 11:14





Antibiotics)   swelling  














Review of Systems


ROS Statement: 


Those systems with pertinent positive or pertinent negative responses have been 

documented in the HPI.





ROS Other: All systems not noted in ROS Statement are negative.





Past Medical History


Past Medical History: Asthma, Diabetes Mellitus, Deep Vein Thrombosis (DVT), 

Hypertension, Osteoarthritis (OA), Pneumonia, Pulmonary Embolus (PE), Rheumatoid

Arthritis (RA), Sleep Apnea/CPAP/BIPAP, Vascular Disorder


Additional Past Medical History / Comment(s): Seizure as infant only.  Uses 2 

canes, walker for distance, Scoliosis. 3/2015-DVT Rt leg and PE ronny lungs.  

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  Hx Kidney Mass w/ 

Removal.  Uses C-PAP.  Recent Bronchitis, completed AB RX, on Steroid Taper.  Hx

stasis ulcers BLE, has varicose veins.


History of Any Multi-Drug Resistant Organisms: None Reported


Past Surgical History: Adenoidectomy, Appendectomy, Hernia Repair, Joint 

Replacement, Tonsillectomy


Additional Past Surgical History / Comment(s): Ronny Hip Replacement, Nephrectomy 

left d/t mass(pt stated found to be benign), Wound clinic procedures.  EGD, 

Colonoscopy.


Past Anesthesia/Blood Transfusion Reactions: No Reported Reaction


Past Psychological History: Anxiety, Depression


Smoking Status: Never smoker





- Past Family History


  ** Son(s)


Additional Family Medical History / Comment(s): He has one son with morbid 

obesity.





  ** Mother


Family Medical History: Cancer


Additional Family Medical History / Comment(s): breast cancer





  ** Father


Family Medical History: Cancer, COPD, Coronary Artery Disease (CAD), 

Hypertension, Myocardial Infarction (MI)


Additional Family Medical History / Comment(s): emphysema





General Exam


Limitations: no limitations





Course


                                   Vital Signs











  08/29/19 08/29/19 08/29/19





  10:50 11:07 11:09


 


Temperature 98.7 F  


 


Pulse Rate 144 H  147 H


 


Pulse Rate [  141 H 





Supine Cardiac   





Monitor]   


 


Respiratory 24  16





Rate   


 


Blood Pressure 106/62  


 


O2 Sat by Pulse 94 L  98





Oximetry   














  08/29/19 08/29/19 08/29/19





  11:39 11:40 11:50


 


Temperature   


 


Pulse Rate 144 H 141 H 120 H


 


Pulse Rate [   





Supine Cardiac   





Monitor]   


 


Respiratory 18 16 17





Rate   


 


Blood Pressure 99/77 99/77 94/61


 


O2 Sat by Pulse 93 L 90 L 96





Oximetry   














Medical Decision Making





- Lab Data


Result diagrams: 


                                 08/29/19 11:04





                                 08/29/19 11:04


                                   Lab Results











  08/29/19 08/29/19 08/29/19 Range/Units





  11:04 11:04 11:04 


 


WBC  11.9 H    (3.8-10.6)  k/uL


 


RBC  5.10    (4.30-5.90)  m/uL


 


Hgb  13.9    (13.0-17.5)  gm/dL


 


Hct  43.7    (39.0-53.0)  %


 


MCV  85.8    (80.0-100.0)  fL


 


MCH  27.3    (25.0-35.0)  pg


 


MCHC  31.8    (31.0-37.0)  g/dL


 


RDW  15.9 H    (11.5-15.5)  %


 


Plt Count  143 L    (150-450)  k/uL


 


Neutrophils %  76    %


 


Lymphocytes %  16    %


 


Monocytes %  5    %


 


Eosinophils %  2    %


 


Basophils %  0    %


 


Neutrophils #  9.1 H    (1.3-7.7)  k/uL


 


Lymphocytes #  1.9    (1.0-4.8)  k/uL


 


Monocytes #  0.6    (0-1.0)  k/uL


 


Eosinophils #  0.2    (0-0.7)  k/uL


 


Basophils #  0.1    (0-0.2)  k/uL


 


Hypochromasia  Slight    


 


PT    9.9  (9.0-12.0)  sec


 


INR    0.9  (<1.2)  


 


APTT    25.0  (22.0-30.0)  sec


 


Sodium   137   (137-145)  mmol/L


 


Potassium   4.3   (3.5-5.1)  mmol/L


 


Chloride   103   ()  mmol/L


 


Carbon Dioxide   26   (22-30)  mmol/L


 


Anion Gap   8   mmol/L


 


BUN   12   (9-20)  mg/dL


 


Creatinine   0.87   (0.66-1.25)  mg/dL


 


Est GFR (CKD-EPI)AfAm   >90   (>60 ml/min/1.73 sqM)  


 


Est GFR (CKD-EPI)NonAf   >90   (>60 ml/min/1.73 sqM)  


 


Glucose   131 H   (74-99)  mg/dL


 


Calcium   8.5   (8.4-10.2)  mg/dL


 


Magnesium   1.5 L   (1.6-2.3)  mg/dL


 


Total Bilirubin   0.8   (0.2-1.3)  mg/dL


 


AST   18   (17-59)  U/L


 


ALT   26   (21-72)  U/L


 


Alkaline Phosphatase   68   ()  U/L


 


Troponin I     (0.000-0.034)  ng/mL


 


Total Protein   6.7   (6.3-8.2)  g/dL


 


Albumin   3.2 L   (3.5-5.0)  g/dL














  08/29/19 Range/Units





  11:04 


 


WBC   (3.8-10.6)  k/uL


 


RBC   (4.30-5.90)  m/uL


 


Hgb   (13.0-17.5)  gm/dL


 


Hct   (39.0-53.0)  %


 


MCV   (80.0-100.0)  fL


 


MCH   (25.0-35.0)  pg


 


MCHC   (31.0-37.0)  g/dL


 


RDW   (11.5-15.5)  %


 


Plt Count   (150-450)  k/uL


 


Neutrophils %   %


 


Lymphocytes %   %


 


Monocytes %   %


 


Eosinophils %   %


 


Basophils %   %


 


Neutrophils #   (1.3-7.7)  k/uL


 


Lymphocytes #   (1.0-4.8)  k/uL


 


Monocytes #   (0-1.0)  k/uL


 


Eosinophils #   (0-0.7)  k/uL


 


Basophils #   (0-0.2)  k/uL


 


Hypochromasia   


 


PT   (9.0-12.0)  sec


 


INR   (<1.2)  


 


APTT   (22.0-30.0)  sec


 


Sodium   (137-145)  mmol/L


 


Potassium   (3.5-5.1)  mmol/L


 


Chloride   ()  mmol/L


 


Carbon Dioxide   (22-30)  mmol/L


 


Anion Gap   mmol/L


 


BUN   (9-20)  mg/dL


 


Creatinine   (0.66-1.25)  mg/dL


 


Est GFR (CKD-EPI)AfAm   (>60 ml/min/1.73 sqM)  


 


Est GFR (CKD-EPI)NonAf   (>60 ml/min/1.73 sqM)  


 


Glucose   (74-99)  mg/dL


 


Calcium   (8.4-10.2)  mg/dL


 


Magnesium   (1.6-2.3)  mg/dL


 


Total Bilirubin   (0.2-1.3)  mg/dL


 


AST   (17-59)  U/L


 


ALT   (21-72)  U/L


 


Alkaline Phosphatase   ()  U/L


 


Troponin I  <0.012  (0.000-0.034)  ng/mL


 


Total Protein   (6.3-8.2)  g/dL


 


Albumin   (3.5-5.0)  g/dL














Disposition


Clinical Impression: 


 Atrial fibrillation





Disposition: ADMITTED AS IP TO THIS HOSP


Condition: Fair


Referrals: 


Doris Edwards MD [Primary Care Provider] - 1-2 days


Decision Time: 12:23

## 2019-08-30 VITALS — TEMPERATURE: 99.3 F | SYSTOLIC BLOOD PRESSURE: 99 MMHG | DIASTOLIC BLOOD PRESSURE: 64 MMHG

## 2019-08-30 VITALS — HEART RATE: 75 BPM

## 2019-08-30 VITALS — RESPIRATION RATE: 18 BRPM

## 2019-08-30 LAB
ALBUMIN SERPL-MCNC: 3.1 G/DL (ref 3.5–5)
ALP SERPL-CCNC: 70 U/L (ref 38–126)
ALT SERPL-CCNC: 22 U/L (ref 21–72)
ANION GAP SERPL CALC-SCNC: 8 MMOL/L
AST SERPL-CCNC: 15 U/L (ref 17–59)
BASOPHILS # BLD AUTO: 0.1 K/UL (ref 0–0.2)
BASOPHILS NFR BLD AUTO: 1 %
BUN SERPL-SCNC: 17 MG/DL (ref 9–20)
CALCIUM SPEC-MCNC: 8.6 MG/DL (ref 8.4–10.2)
CHLORIDE SERPL-SCNC: 100 MMOL/L (ref 98–107)
CO2 SERPL-SCNC: 29 MMOL/L (ref 22–30)
EOSINOPHIL # BLD AUTO: 0.3 K/UL (ref 0–0.7)
EOSINOPHIL NFR BLD AUTO: 3 %
ERYTHROCYTE [DISTWIDTH] IN BLOOD BY AUTOMATED COUNT: 4.88 M/UL (ref 4.3–5.9)
ERYTHROCYTE [DISTWIDTH] IN BLOOD: 16 % (ref 11.5–15.5)
GLUCOSE BLD-MCNC: 119 MG/DL (ref 75–99)
GLUCOSE BLD-MCNC: 120 MG/DL (ref 75–99)
GLUCOSE SERPL-MCNC: 128 MG/DL (ref 74–99)
HCT VFR BLD AUTO: 42.3 % (ref 39–53)
HGB BLD-MCNC: 13.3 GM/DL (ref 13–17.5)
LYMPHOCYTES # SPEC AUTO: 1.6 K/UL (ref 1–4.8)
LYMPHOCYTES NFR SPEC AUTO: 17 %
MAGNESIUM SPEC-SCNC: 2 MG/DL (ref 1.6–2.3)
MCH RBC QN AUTO: 27.2 PG (ref 25–35)
MCHC RBC AUTO-ENTMCNC: 31.3 G/DL (ref 31–37)
MCV RBC AUTO: 86.8 FL (ref 80–100)
MONOCYTES # BLD AUTO: 0.4 K/UL (ref 0–1)
MONOCYTES NFR BLD AUTO: 5 %
NEUTROPHILS # BLD AUTO: 7.1 K/UL (ref 1.3–7.7)
NEUTROPHILS NFR BLD AUTO: 74 %
PLATELET # BLD AUTO: 145 K/UL (ref 150–450)
POTASSIUM SERPL-SCNC: 4.4 MMOL/L (ref 3.5–5.1)
PROT SERPL-MCNC: 6.6 G/DL (ref 6.3–8.2)
SODIUM SERPL-SCNC: 137 MMOL/L (ref 137–145)
WBC # BLD AUTO: 9.6 K/UL (ref 3.8–10.6)

## 2019-08-30 RX ADMIN — CEFAZOLIN SCH: 330 INJECTION, POWDER, FOR SOLUTION INTRAMUSCULAR; INTRAVENOUS at 13:24

## 2019-08-30 RX ADMIN — METOPROLOL TARTRATE SCH MG: 25 TABLET, FILM COATED ORAL at 09:12

## 2019-08-30 RX ADMIN — ATORVASTATIN CALCIUM SCH MG: 40 TABLET, FILM COATED ORAL at 09:11

## 2019-08-30 RX ADMIN — AMIODARONE HYDROCHLORIDE SCH MG: 200 TABLET ORAL at 09:11

## 2019-08-30 RX ADMIN — DILTIAZEM HYDROCHLORIDE SCH: 5 INJECTION INTRAVENOUS at 00:18

## 2019-08-30 NOTE — P.DS
Providers


Date of admission: 


08/30/19 10:37





Expected date of discharge: 08/30/19


Attending physician: 


Doris Edwards





Consults: 





                                        





08/29/19 12:04


Consult Physician Routine 


   Consulting Provider: Tonio Cordero


   Consult Reason/Comments: afib rvr


   Do you want consulting provider notified?: Yes











Primary care physician: 


Doris Jerry





[facility] Course: 


Discharge Diagnosis





1.  New onset atrial fibrillation with rapid ventricular response.  EKG 

completed showing atrial fibrillation with rapid ventricular response.  

Nonspecific ST abnormality probably digitalis effect.  TSH level 1.730.  Patient

started on Cardizem drip cardiology services have been consulted.  Discussed 

case with cardiology services.  Patient has converted to sinus rhythm.  Patient 

to be DC'd home on amiodarone 400 twice a day 1 week and then decrease to 200 

twice a day.  Patient also to be DC'd on increased dose of Lopressor 50 twice a 

day and xarelto 20 mg daily.  Patient to follow up outpatient with cardiology 

services





2.  Hypomagnesemia.  Magnesium 1.5 replacement protocol.  repeat Mag 2.0





3.  History of asthma.  No exacerbation at this time





4.  History of DVT and PE personally 3 years ago.  Patient is maintained on 

xarelto.  dose  increased to 20mg daily for A. fib coverage





5.  History of essential hypertension





6.  History of osteoarthritis





7.  History of sleep apnea





8.  History of rheumatoid arthritis





9.  History of anxiety and depression





Hospital course





This is a 64-year-old male patient who presented to the ER with complaints of 

new onset atrial fibrillation with RVR.  Patient reports that he went to a 

follow-up appointment with his PCP where it was found that he had an irregular 

heartbeat EKG was completed showing patient in A. fib with RVR.  Patient reports

he has not had any symptoms including chest pain shortness of breath or fatigue.

 Patient does have past medical history of asthma, DVT, hypertension, 

osteoporosis, pneumonia, pulmonary embolism, rheumatoid arthritis, sleep apnea, 

anxiety, depression and obesity.  Patient is currently on Xarelto for DVT and PE

that occurred 3 years ago.  Chest x-ray completed showing no acute process.  EKG

completed showing atrial fibrillation with rapid ventricular response heart rate

of 141.  Patient was started on Cardizem drip cardiology services have been 

consulted.  At this time patient denies any chest pain or shortness breath.  

Patient denies nausea vomiting or diarrhea.  Patient denies any urinary 

frequency. 





On 08/30/2019 patient's alert and oriented 3.  Patient has converted back to 

normal sinus rhythm.  Discussed case with cardiology Dr. Cordero.  Patient may be

discharged home on amiodarone 400 twice a day 1 week and then 200 twice a day. 

Lopressor also increased to 50 mg twice a day.  Xarelto dose increased to 20 mg 

daily to cover A. fib prophylaxis.  Patient denies any chest pain or shortness 

of breath.  Patient denies nausea vomiting or diarrhea.  Patient denies any 

urinary burning or frequency.  Patient to follow up with cardiology services and

PCP for further management





I performed an examination of the patient and discussed their management with 

the Nurse Practitioner.  I have reviewed the Nurse Practitioner's notes and 

agree with the documented findings and plan of care


Patient Condition at Discharge: Stable





Plan - Discharge Summary


Discharge Rx Participant: No


New Discharge Prescriptions: 


New


   Amiodarone [Cordarone] 200 mg PO BID 30 Days #42 tab


   Metoprolol Tartrate [Lopressor] 50 mg PO BID 30 Days #60 tab


   Rivaroxaban [Xarelto] 20 mg PO DAILY 30 Days #30 tab


   Atorvastatin [Lipitor] 40 mg PO DAILY 30 Days #30 tab





Continue


   Carisoprodol [Soma] 350 mg PO TID PRN


     PRN Reason: Pain


   traMADol HCL [Ultram] 50 mg PO Q8HR PRN


     PRN Reason: Pain


   Albuterol Nebulized [Ventolin Nebulized] 2.5 mg INHALATION TID PRN


     PRN Reason: Shortness Of Breath


   metFORMIN HCL [metFORMIN HCL ER] 1,000 mg PO BID


   Multivitamin with Iron [Multivitamins with Iron] 1 tab PO DAILY


   Furosemide [Lasix] 40 mg PO DAILY PRN


     PRN Reason: Edema


   Ergocalciferol (Vitamin D2) [Vitamin D2] 50,000 unit PO Q30D





Discontinued


   Rivaroxaban [Xarelto] 10 mg PO DAILY


   Metoprolol Tartrate 25 mg PO BID


   Lisinopril [Zestril] 5 mg PO DAILY


Discharge Medication List





Carisoprodol [Soma] 350 mg PO TID PRN 05/01/14 [History]


traMADol HCL [Ultram] 50 mg PO Q8HR PRN 11/15/17 [History]


Albuterol Nebulized [Ventolin Nebulized] 2.5 mg INHALATION TID PRN 05/08/18 

[History]


Multivitamin with Iron [Multivitamins with Iron] 1 tab PO DAILY 12/03/18 

[History]


metFORMIN HCL [metFORMIN HCL ER] 1,000 mg PO BID 12/03/18 [History]


Ergocalciferol (Vitamin D2) [Vitamin D2] 50,000 unit PO Q30D 08/08/19 [History]


Furosemide [Lasix] 40 mg PO DAILY PRN 08/08/19 [History]


Amiodarone [Cordarone] 200 mg PO BID 30 Days #42 tab 08/30/19 [Rx]


Atorvastatin [Lipitor] 40 mg PO DAILY 30 Days #30 tab 08/30/19 [Rx]


Metoprolol Tartrate [Lopressor] 50 mg PO BID 30 Days #60 tab 08/30/19 [Rx]


Rivaroxaban [Xarelto] 20 mg PO DAILY 30 Days #30 tab 08/30/19 [Rx]








Follow up Appointment(s)/Referral(s): 


Doris Edwards MD [Primary Care Provider] - 1-2 days


Tonio Cordero MD [STAFF PHYSICIAN] - 1 Week


Activity/Diet/Wound Care/Special Instructions: 


Activity as tolerated





Diet heart healthy consistent carb


Discharge Disposition: HOME SELF-CARE

## 2019-08-30 NOTE — PN
PROGRESS NOTE



Mr. Groves is a 64-year-old male with a known history of hypertension, history of

diabetes who presented with atrial fibrillation of unknown duration.  He is back in

sinus mechanism.  He is feeling well.  He is denying any chest pain.  No dizziness.  No

palpitation.  He denies any nausea.  He checked his smart watch and he did not notice

any heart rate above 101 over the last week.



He continued to be on Xarelto 20 mg daily, metoprolol tartrate 50 mg twice a day,

amiodarone 400 twice a day and Lipitor 40 mg daily.



PHYSICAL EXAMINATION:

Blood pressure 103/60 with a heart rate in the 70s.

LUNGS:  Clear.

HEART:  Regular rate and rhythm, S1, S2.  No S3.  No rub.

ABDOMEN:  Soft, obese, nontender.

EXTREMITIES:  With chronic skin changes and chronic stasis.



LAB DATA:

Revealed BUN and creatinine of 17 and 1.01, potassium 4.4, hemoglobin 13.3.  He had an

echocardiogram that revealed a severely impaired left ventricular systolic function

which is new for him with an ejection fraction of 30% to 35% with no significant valve

disease.



IMPRESSION:

1. Paroxysmal atrial fibrillation of unknown duration in the past.

2. Morbid obesity.

3. History of deep venous thrombosis.

4. Cardiomyopathy.

5. Diabetes.



RECOMMENDATION:

In the past his left ventricular systolic function was normal.  At this time, I will

continue present therapy.  It is possible that some of it is related to the LV

dysfunction.  I will continue present therapy.  His blood pressure is on the low side

so we will hold on adding an ACE inhibitor at this time.  He will be further evaluated

in the office and if his blood pressure is stable, then an ACE inhibitor will be added

and I will repeat his echo at that time.  In regard to the amiodarone, he will be sent

home on 400 mg twice a day for a week, then 200 mg twice a day.





MMODL / IJN: [id number] / Job#: [id number]

## 2019-11-15 ENCOUNTER — HOSPITAL ENCOUNTER (OUTPATIENT)
Dept: HOSPITAL 47 - LABWHC1 | Age: 64
Discharge: HOME | End: 2019-11-15
Attending: THORACIC SURGERY (CARDIOTHORACIC VASCULAR SURGERY)
Payer: MEDICARE

## 2019-11-15 DIAGNOSIS — L97.222: ICD-10-CM

## 2019-11-15 DIAGNOSIS — E03.2: ICD-10-CM

## 2019-11-15 DIAGNOSIS — E66.01: ICD-10-CM

## 2019-11-15 DIAGNOSIS — I87.2: ICD-10-CM

## 2019-11-15 DIAGNOSIS — E11.622: ICD-10-CM

## 2019-11-15 DIAGNOSIS — I10: Primary | ICD-10-CM

## 2019-11-15 LAB
ALBUMIN SERPL-MCNC: 3.4 G/DL (ref 3.8–4.9)
ALBUMIN/GLOB SERPL: 1.1 G/DL (ref 1.6–3.17)
ALP SERPL-CCNC: 67 U/L (ref 41–126)
ALT SERPL-CCNC: 11 U/L (ref 10–49)
ANION GAP SERPL CALC-SCNC: 5.1 MMOL/L (ref 4–12)
AST SERPL-CCNC: 14 U/L (ref 14–35)
BASOPHILS # BLD AUTO: 0.1 K/UL (ref 0–0.2)
BASOPHILS NFR BLD AUTO: 1 %
BUN SERPL-SCNC: 21 MG/DL (ref 9–27)
BUN/CREAT SERPL: 21 RATIO (ref 12–20)
CALCIUM SPEC-MCNC: 8.6 MG/DL (ref 8.7–10.3)
CHLORIDE SERPL-SCNC: 102 MMOL/L (ref 96–109)
CO2 SERPL-SCNC: 31.9 MMOL/L (ref 21.6–31.8)
EOSINOPHIL # BLD AUTO: 0.2 K/UL (ref 0–0.7)
EOSINOPHIL NFR BLD AUTO: 3 %
ERYTHROCYTE [DISTWIDTH] IN BLOOD BY AUTOMATED COUNT: 4.75 M/UL (ref 4.3–5.9)
ERYTHROCYTE [DISTWIDTH] IN BLOOD: 14.4 % (ref 11.5–15.5)
GLOBULIN SER CALC-MCNC: 3.1 G/DL (ref 1.6–3.3)
GLUCOSE SERPL-MCNC: 92 MG/DL (ref 70–110)
HBA1C MFR BLD: 6.1 % (ref 4–6)
HCT VFR BLD AUTO: 40.7 % (ref 39–53)
HGB BLD-MCNC: 12.2 GM/DL (ref 13–17.5)
LYMPHOCYTES # SPEC AUTO: 1.4 K/UL (ref 1–4.8)
LYMPHOCYTES NFR SPEC AUTO: 16 %
MCH RBC QN AUTO: 25.8 PG (ref 25–35)
MCHC RBC AUTO-ENTMCNC: 30.1 G/DL (ref 31–37)
MCV RBC AUTO: 85.6 FL (ref 80–100)
MONOCYTES # BLD AUTO: 0.5 K/UL (ref 0–1)
MONOCYTES NFR BLD AUTO: 5 %
NEUTROPHILS # BLD AUTO: 6.5 K/UL (ref 1.3–7.7)
NEUTROPHILS NFR BLD AUTO: 74 %
PLATELET # BLD AUTO: 181 K/UL (ref 150–450)
POTASSIUM SERPL-SCNC: 4.8 MMOL/L (ref 3.5–5.5)
PREALB SERPL-MCNC: 10 MG/DL (ref 18–42)
PROT SERPL-MCNC: 6.5 G/DL (ref 6.2–8.2)
SODIUM SERPL-SCNC: 139 MMOL/L (ref 135–145)
WBC # BLD AUTO: 8.7 K/UL (ref 3.8–10.6)

## 2019-11-15 PROCEDURE — 36415 COLL VENOUS BLD VENIPUNCTURE: CPT

## 2019-11-15 PROCEDURE — 85025 COMPLETE CBC W/AUTO DIFF WBC: CPT

## 2019-11-15 PROCEDURE — 80053 COMPREHEN METABOLIC PANEL: CPT

## 2019-11-15 PROCEDURE — 84134 ASSAY OF PREALBUMIN: CPT

## 2019-11-15 PROCEDURE — 84443 ASSAY THYROID STIM HORMONE: CPT

## 2019-11-15 PROCEDURE — 83036 HEMOGLOBIN GLYCOSYLATED A1C: CPT

## 2019-11-21 ENCOUNTER — HOSPITAL ENCOUNTER (OUTPATIENT)
Dept: HOSPITAL 47 - RADUSWWP | Age: 64
Discharge: HOME | End: 2019-11-21
Attending: THORACIC SURGERY (CARDIOTHORACIC VASCULAR SURGERY)
Payer: MEDICARE

## 2019-11-21 DIAGNOSIS — Z91.048: ICD-10-CM

## 2019-11-21 DIAGNOSIS — Z88.2: ICD-10-CM

## 2019-11-21 DIAGNOSIS — L97.222: ICD-10-CM

## 2019-11-21 DIAGNOSIS — E11.622: Primary | ICD-10-CM

## 2019-11-21 DIAGNOSIS — I87.2: ICD-10-CM

## 2019-11-21 DIAGNOSIS — E66.01: ICD-10-CM

## 2019-11-21 PROCEDURE — 93922 UPR/L XTREMITY ART 2 LEVELS: CPT

## 2019-11-21 PROCEDURE — 93970 EXTREMITY STUDY: CPT

## 2019-11-21 NOTE — US
EXAMINATION TYPE: US venous doppler duplex LE 

 

DATE OF EXAM: 11/21/2019 3:07 PM

 

COMPARISON: NONE

 

CLINICAL HISTORY: L97.222 CHR ULCER LT CALF.

 

SIDE PERFORMED: Bilateral  

 

TECHNIQUE:  The lower extremity deep venous system is examined utilizing real time linear array sonog
susan with graded compression, doppler sonography and color-flow sonography.

 

VESSELS IMAGED:

External Iliac Vein (EIV)-not seen

Common Femoral Vein-not seen

Deep Femoral Vein-not seen

Greater Saphenous Vein *-not seen

Femoral Vein

Popliteal Vein

Small Saphenous Vein *-not seen

Proximal Calf Veins-not seen

(* superficial vessels)

 

Gross morbid obesity. Unable to scan in bilateral groin due to large panus. 

 

Right Leg:  Femoral vein appears negative for DVT, popliteal vein viewed in a very limited capacity, 
unable to turn probe in sagittal plane due to patients size, other vessels not seen. 

There is no obvious reflux seen on Right in these limited views.

 

Left Leg: Femoral vein appears negative for DVT, limited views of popliteal vein,  unable to turn pro
be in sagittal plane due to patients size.

There is reflux seen in femoral vein and limited views of popliteal vein.

 

 

IMPRESSION: Limited evaluation without definite evidence for DVT at this time.

## 2020-03-13 ENCOUNTER — HOSPITAL ENCOUNTER (OUTPATIENT)
Dept: HOSPITAL 47 - DBWHC3 | Age: 65
Discharge: HOME | End: 2020-03-13
Attending: PODIATRIST
Payer: MEDICARE

## 2020-03-13 VITALS — BODY MASS INDEX: 71.6 KG/M2

## 2020-03-13 DIAGNOSIS — L97.222: ICD-10-CM

## 2020-03-13 DIAGNOSIS — I87.2: ICD-10-CM

## 2020-03-13 DIAGNOSIS — E66.01: ICD-10-CM

## 2020-03-13 DIAGNOSIS — E11.622: Primary | ICD-10-CM
